# Patient Record
Sex: MALE | Race: WHITE | NOT HISPANIC OR LATINO
[De-identification: names, ages, dates, MRNs, and addresses within clinical notes are randomized per-mention and may not be internally consistent; named-entity substitution may affect disease eponyms.]

---

## 2018-05-01 ENCOUNTER — CHARTING TRANS (OUTPATIENT)
Dept: OTHER | Age: 38
End: 2018-05-01

## 2018-05-09 ENCOUNTER — IMAGING SERVICES (OUTPATIENT)
Dept: OTHER | Age: 38
End: 2018-05-09

## 2018-05-09 ENCOUNTER — HOSPITAL (OUTPATIENT)
Dept: OTHER | Age: 38
End: 2018-05-09

## 2018-11-01 VITALS
OXYGEN SATURATION: 95 % | DIASTOLIC BLOOD PRESSURE: 82 MMHG | HEIGHT: 70 IN | WEIGHT: 242.51 LBS | TEMPERATURE: 98.6 F | HEART RATE: 116 BPM | RESPIRATION RATE: 18 BRPM | BODY MASS INDEX: 34.72 KG/M2 | SYSTOLIC BLOOD PRESSURE: 132 MMHG

## 2018-11-26 ENCOUNTER — TELEPHONE (OUTPATIENT)
Dept: FAMILY MEDICINE CLINIC | Facility: CLINIC | Age: 38
End: 2018-11-26

## 2018-12-03 ENCOUNTER — LAB ENCOUNTER (OUTPATIENT)
Dept: LAB | Facility: REFERENCE LAB | Age: 38
End: 2018-12-03
Attending: FAMILY MEDICINE
Payer: COMMERCIAL

## 2018-12-03 ENCOUNTER — HOSPITAL ENCOUNTER (OUTPATIENT)
Dept: GENERAL RADIOLOGY | Age: 38
Discharge: HOME | End: 2018-12-03
Attending: FAMILY MEDICINE
Payer: COMMERCIAL

## 2018-12-03 ENCOUNTER — OFFICE VISIT (OUTPATIENT)
Dept: FAMILY MEDICINE CLINIC | Facility: CLINIC | Age: 38
End: 2018-12-03
Payer: COMMERCIAL

## 2018-12-03 VITALS
RESPIRATION RATE: 18 BRPM | TEMPERATURE: 98 F | DIASTOLIC BLOOD PRESSURE: 88 MMHG | WEIGHT: 251.63 LBS | HEART RATE: 96 BPM | HEIGHT: 69.5 IN | SYSTOLIC BLOOD PRESSURE: 126 MMHG | BODY MASS INDEX: 36.43 KG/M2

## 2018-12-03 DIAGNOSIS — R73.01 IMPAIRED FASTING GLUCOSE: ICD-10-CM

## 2018-12-03 DIAGNOSIS — Z00.00 ROUTINE ADULT HEALTH MAINTENANCE: ICD-10-CM

## 2018-12-03 DIAGNOSIS — M77.41 METATARSALGIA OF RIGHT FOOT: Primary | ICD-10-CM

## 2018-12-03 DIAGNOSIS — Z87.39 HISTORY OF GOUT: ICD-10-CM

## 2018-12-03 DIAGNOSIS — M77.41 METATARSALGIA OF RIGHT FOOT: ICD-10-CM

## 2018-12-03 DIAGNOSIS — E66.9 CLASS 2 OBESITY WITHOUT SERIOUS COMORBIDITY WITH BODY MASS INDEX (BMI) OF 36.0 TO 36.9 IN ADULT, UNSPECIFIED OBESITY TYPE: ICD-10-CM

## 2018-12-03 PROBLEM — E66.812 CLASS 2 OBESITY WITHOUT SERIOUS COMORBIDITY WITH BODY MASS INDEX (BMI) OF 36.0 TO 36.9 IN ADULT: Status: ACTIVE | Noted: 2018-12-03

## 2018-12-03 PROCEDURE — 83036 HEMOGLOBIN GLYCOSYLATED A1C: CPT | Performed by: FAMILY MEDICINE

## 2018-12-03 PROCEDURE — 99203 OFFICE O/P NEW LOW 30 MIN: CPT | Performed by: FAMILY MEDICINE

## 2018-12-03 PROCEDURE — 73630 X-RAY EXAM OF FOOT: CPT | Performed by: FAMILY MEDICINE

## 2018-12-03 PROCEDURE — 81001 URINALYSIS AUTO W/SCOPE: CPT | Performed by: FAMILY MEDICINE

## 2018-12-03 PROCEDURE — 36415 COLL VENOUS BLD VENIPUNCTURE: CPT | Performed by: FAMILY MEDICINE

## 2018-12-03 PROCEDURE — 84550 ASSAY OF BLOOD/URIC ACID: CPT | Performed by: FAMILY MEDICINE

## 2018-12-03 PROCEDURE — 80050 GENERAL HEALTH PANEL: CPT | Performed by: FAMILY MEDICINE

## 2018-12-03 PROCEDURE — 80061 LIPID PANEL: CPT | Performed by: FAMILY MEDICINE

## 2018-12-03 RX ORDER — METHYLPREDNISOLONE 4 MG/1
TABLET ORAL
Qty: 1 PACKAGE | Refills: 0 | Status: SHIPPED | OUTPATIENT
Start: 2018-12-03 | End: 2019-05-02

## 2018-12-03 NOTE — PATIENT INSTRUCTIONS
PLAN:  -fasting bloodwork  -urine analysis  -Xray R Foot  -gout diet information provided  -steroid pack    -start food diary.  I recommend [employer]    -return for physical at earliest convenience/as needed

## 2018-12-06 ENCOUNTER — TELEPHONE (OUTPATIENT)
Dept: FAMILY MEDICINE CLINIC | Facility: CLINIC | Age: 38
End: 2018-12-06

## 2018-12-06 NOTE — TELEPHONE ENCOUNTER
Ok great. F/u prn if foot pain continues after MDP. Suspect metatarsalgia and NOT gout attack. (patient described hx of gout, last uric acid 8.5 so typically goal <6, however clinical presentation did not seem c/w this).  CPM.

## 2018-12-06 NOTE — TELEPHONE ENCOUNTER
Called pt; Advised of nml results and xray, except: elevated trig and elevation of uric acid. Advised of regular exercise, low carb diet and daily omega 3/fish oil for cholesterol lowering - pt verbalizes understanding of recommendations and results.

## 2018-12-06 NOTE — TELEPHONE ENCOUNTER
----- Message from Ele Krishna DO sent at 12/5/2018  3:51 PM CST -----  Nml CBC/CMP/TSH/UA. A1C neg for DM II. Lipids: LDL chol minimally elevated; trig elevated.  Rec HHD/DASH diet regarding LDL chol lowering; regular exercise: goal 5x/week of mo

## 2019-05-02 RX ORDER — METHYLPREDNISOLONE 4 MG/1
TABLET ORAL
Qty: 1 PACKAGE | Refills: 0 | Status: SHIPPED | OUTPATIENT
Start: 2019-05-02 | End: 2019-06-17

## 2019-05-02 NOTE — TELEPHONE ENCOUNTER
A refill request was received for:  Requested Prescriptions     Pending Prescriptions Disp Refills   • methylPREDNISolone 4 MG Oral Tablet Therapy Pack 1 Package 0     Sig: Take blister pack of tablets as directed x 6 days     Last refill date: 12/3/2018

## 2019-06-14 ENCOUNTER — APPOINTMENT (OUTPATIENT)
Dept: CT IMAGING | Facility: HOSPITAL | Age: 39
End: 2019-06-14
Attending: EMERGENCY MEDICINE
Payer: COMMERCIAL

## 2019-06-14 ENCOUNTER — HOSPITAL ENCOUNTER (EMERGENCY)
Facility: HOSPITAL | Age: 39
Discharge: HOME | End: 2019-06-14
Attending: EMERGENCY MEDICINE
Payer: COMMERCIAL

## 2019-06-14 VITALS
OXYGEN SATURATION: 96 % | RESPIRATION RATE: 16 BRPM | TEMPERATURE: 98 F | HEART RATE: 102 BPM | DIASTOLIC BLOOD PRESSURE: 89 MMHG | SYSTOLIC BLOOD PRESSURE: 141 MMHG

## 2019-06-14 DIAGNOSIS — N23 RENAL COLIC: Primary | ICD-10-CM

## 2019-06-14 PROCEDURE — 81001 URINALYSIS AUTO W/SCOPE: CPT | Performed by: EMERGENCY MEDICINE

## 2019-06-14 PROCEDURE — 96375 TX/PRO/DX INJ NEW DRUG ADDON: CPT

## 2019-06-14 PROCEDURE — 96374 THER/PROPH/DIAG INJ IV PUSH: CPT

## 2019-06-14 PROCEDURE — 99284 EMERGENCY DEPT VISIT MOD MDM: CPT

## 2019-06-14 PROCEDURE — 85025 COMPLETE CBC W/AUTO DIFF WBC: CPT | Performed by: EMERGENCY MEDICINE

## 2019-06-14 PROCEDURE — 74176 CT ABD & PELVIS W/O CONTRAST: CPT | Performed by: EMERGENCY MEDICINE

## 2019-06-14 PROCEDURE — 96361 HYDRATE IV INFUSION ADD-ON: CPT

## 2019-06-14 PROCEDURE — 80048 BASIC METABOLIC PNL TOTAL CA: CPT | Performed by: EMERGENCY MEDICINE

## 2019-06-14 RX ORDER — ONDANSETRON 4 MG/1
4 TABLET, ORALLY DISINTEGRATING ORAL EVERY 8 HOURS PRN
Qty: 10 TABLET | Refills: 0 | Status: SHIPPED | OUTPATIENT
Start: 2019-06-14 | End: 2019-08-07

## 2019-06-14 RX ORDER — ONDANSETRON 2 MG/ML
4 INJECTION INTRAMUSCULAR; INTRAVENOUS ONCE
Status: COMPLETED | OUTPATIENT
Start: 2019-06-14 | End: 2019-06-14

## 2019-06-14 RX ORDER — MORPHINE SULFATE 4 MG/ML
8 INJECTION, SOLUTION INTRAMUSCULAR; INTRAVENOUS ONCE
Status: COMPLETED | OUTPATIENT
Start: 2019-06-14 | End: 2019-06-14

## 2019-06-14 RX ORDER — ONDANSETRON 2 MG/ML
4 INJECTION INTRAMUSCULAR; INTRAVENOUS ONCE
Status: DISCONTINUED | OUTPATIENT
Start: 2019-06-14 | End: 2019-06-14

## 2019-06-14 RX ORDER — TAMSULOSIN HYDROCHLORIDE 0.4 MG/1
0.4 CAPSULE ORAL DAILY
Qty: 7 CAPSULE | Refills: 0 | Status: SHIPPED | OUTPATIENT
Start: 2019-06-14 | End: 2019-06-17

## 2019-06-14 RX ORDER — HYDROCODONE BITARTRATE AND ACETAMINOPHEN 5; 325 MG/1; MG/1
1 TABLET ORAL EVERY 6 HOURS PRN
Qty: 10 TABLET | Refills: 0 | Status: SHIPPED | OUTPATIENT
Start: 2019-06-14 | End: 2019-06-17

## 2019-06-14 NOTE — ED PROVIDER NOTES
Patient Seen in: [facility] AND [facility] Emergency Department    History   Patient presents with:  Abdomen/Flank Pain (GI/): left flank     Stated Complaint: L.  Flank Pain    HPI    75-year-old male who is relatively healthy here with his wife for evaluation time.   Skin: Skin is warm but slightly diaphoretic. Psychiatric: Normal mood and affect. Behavior is normal.   Nursing note and vitals reviewed. Differential diagnosis includes renal colic, ureterolithiasis, hydronephrosis.       ED Course     Labs Re been electronically signed and verified by the Radiologist whose name is printed above. DD:  06/14/2019/DT:  06/14/2019      MDM   Patient feels markedly improved after second dose of treatment. He is tolerating p.o. His vitals have improved.   His wif

## 2019-06-14 NOTE — ED INITIAL ASSESSMENT (HPI)
Patient woke up \"out of nowhere\" at about 2am. With Stabbing left flank/back pain.  Vomiting 2x pta arrival.

## 2019-06-17 ENCOUNTER — TELEPHONE (OUTPATIENT)
Dept: FAMILY MEDICINE CLINIC | Facility: CLINIC | Age: 39
End: 2019-06-17

## 2019-06-17 ENCOUNTER — OFFICE VISIT (OUTPATIENT)
Dept: FAMILY MEDICINE CLINIC | Facility: CLINIC | Age: 39
End: 2019-06-17
Payer: COMMERCIAL

## 2019-06-17 VITALS
RESPIRATION RATE: 18 BRPM | TEMPERATURE: 99 F | OXYGEN SATURATION: 99 % | DIASTOLIC BLOOD PRESSURE: 90 MMHG | HEIGHT: 69.5 IN | WEIGHT: 243 LBS | SYSTOLIC BLOOD PRESSURE: 130 MMHG | HEART RATE: 74 BPM | BODY MASS INDEX: 35.18 KG/M2

## 2019-06-17 DIAGNOSIS — R71.8 ABNORMAL RBC: ICD-10-CM

## 2019-06-17 DIAGNOSIS — M79.671 PAIN IN BOTH FEET: ICD-10-CM

## 2019-06-17 DIAGNOSIS — R73.9 HYPERGLYCEMIA: ICD-10-CM

## 2019-06-17 DIAGNOSIS — M79.672 PAIN IN BOTH FEET: ICD-10-CM

## 2019-06-17 DIAGNOSIS — N20.0 KIDNEY STONE: Primary | ICD-10-CM

## 2019-06-17 DIAGNOSIS — Z00.00 ROUTINE GENERAL MEDICAL EXAMINATION AT A HEALTH CARE FACILITY: ICD-10-CM

## 2019-06-17 DIAGNOSIS — K42.9 UMBILICAL HERNIA WITHOUT OBSTRUCTION AND WITHOUT GANGRENE: ICD-10-CM

## 2019-06-17 DIAGNOSIS — N62 GYNECOMASTIA: ICD-10-CM

## 2019-06-17 DIAGNOSIS — J98.11 ATELECTASIS: ICD-10-CM

## 2019-06-17 DIAGNOSIS — R06.83 SNORING: ICD-10-CM

## 2019-06-17 DIAGNOSIS — M25.521 RIGHT ELBOW PAIN: ICD-10-CM

## 2019-06-17 DIAGNOSIS — M10.9 GOUT, UNSPECIFIED CAUSE, UNSPECIFIED CHRONICITY, UNSPECIFIED SITE: ICD-10-CM

## 2019-06-17 DIAGNOSIS — R79.89 ELEVATED SERUM CREATININE: ICD-10-CM

## 2019-06-17 DIAGNOSIS — R53.83 OTHER FATIGUE: ICD-10-CM

## 2019-06-17 DIAGNOSIS — N13.30 HYDRONEPHROSIS, UNSPECIFIED HYDRONEPHROSIS TYPE: ICD-10-CM

## 2019-06-17 DIAGNOSIS — R40.0 DAYTIME SOMNOLENCE: ICD-10-CM

## 2019-06-17 PROCEDURE — 87086 URINE CULTURE/COLONY COUNT: CPT | Performed by: FAMILY MEDICINE

## 2019-06-17 PROCEDURE — 99215 OFFICE O/P EST HI 40 MIN: CPT | Performed by: FAMILY MEDICINE

## 2019-06-17 PROCEDURE — 81015 MICROSCOPIC EXAM OF URINE: CPT | Performed by: FAMILY MEDICINE

## 2019-06-17 PROCEDURE — 81003 URINALYSIS AUTO W/O SCOPE: CPT | Performed by: FAMILY MEDICINE

## 2019-06-17 RX ORDER — TAMSULOSIN HYDROCHLORIDE 0.4 MG/1
0.4 CAPSULE ORAL DAILY
Qty: 30 CAPSULE | Refills: 0 | Status: SHIPPED | OUTPATIENT
Start: 2019-06-17 | End: 2019-07-17

## 2019-06-17 RX ORDER — HYDROCODONE BITARTRATE AND ACETAMINOPHEN 5; 325 MG/1; MG/1
1 TABLET ORAL EVERY 8 HOURS PRN
Qty: 20 TABLET | Refills: 0 | Status: SHIPPED | OUTPATIENT
Start: 2019-06-17 | End: 2019-08-07

## 2019-06-17 NOTE — PATIENT INSTRUCTIONS
PLAN:  -urine in the office nml-->sent to lab  -continue Flomax  -continue Norco as needed for pain  -stay well-hydrated  -avoid caffeinated beverages  -low oxalate diet  -try to catch stone  -fasting labs ordered  -referral to Urology    -sleep study orde

## 2019-06-17 NOTE — PROGRESS NOTES
HPI: 45year old male presents for 3 day ER f/u on kidney stone. VSS. CBC, CMP, UA done in ER. CBC WNLs, BMP elev glucose-130, Cr elev 1.41; UA large bld, hyaline casts, (+) RBCs, few bacteria; CBC elev RBC's-5.81. No urine cx performed.  Prescribed Zofran, infusion. LUNG BASES:  The heart is normal in size. There is dependent subsegmental atelectasis bilaterally. KIDNEYS:          A punctate left renal calculus is identified. An obstructing 0.3 cm calculus is lodged at the left ureterovesical junction.  The are appreciated. OTHER:             Trace right greater than left gynecomastia is noted. No free air or fluid is seen in the abdomen or pelvis. CONCLUSION:   1.  There is a punctate obstructing calculus lodged at the left ureterovesical junction with thyroid: no enlargement/tenderness/nodules. Lungs: Clear to auscultation bilaterally. Heart: Regular rate and rhythm, S1, S2 normal, no murmur, click, rub, or gallop.   Breasts: No breast masses, tenderness, asymmetry, nipple discharge or axillary lympha Norco as needed for pain  -stay well-hydrated  -avoid caffeinated beverages  -low oxalate diet  -try to catch stone  -fasting labs ordered  -referral to Urology    -sleep study ordered  -post-test consultation with Pulmonology    -breast US ordered    -ref

## 2019-06-18 NOTE — TELEPHONE ENCOUNTER
Can we get patient in to see Urology-Dr. Pari Draper or partner in the next couple weeks? Patient with obstructing 0.3cm L kidney stone, (+) assoc hydronephrosis, renal edema. CT abd/pelvis from ER 06/14/19 in [facility].     Thank you

## 2019-06-18 NOTE — TELEPHONE ENCOUNTER
Spoke to CHRISTIANO from Dr Cheryle Rex office. They are not on epic and I need to fax the results of CT to the office. A nurse will call me back with a date of appointment. CT was faxed to office.   FYI

## 2019-06-18 NOTE — TELEPHONE ENCOUNTER
Lory Lefort from dr Amy Dorsey office called me back. They will see Juan Francisco Talitahector this afternoon. Patient is aware.

## 2019-06-19 ENCOUNTER — LAB ENCOUNTER (OUTPATIENT)
Dept: LAB | Facility: REFERENCE LAB | Age: 39
End: 2019-06-19
Attending: FAMILY MEDICINE
Payer: COMMERCIAL

## 2019-06-19 ENCOUNTER — TELEPHONE (OUTPATIENT)
Dept: FAMILY MEDICINE CLINIC | Facility: CLINIC | Age: 39
End: 2019-06-19

## 2019-06-19 DIAGNOSIS — R53.83 OTHER FATIGUE: ICD-10-CM

## 2019-06-19 DIAGNOSIS — M10.9 GOUT, UNSPECIFIED CAUSE, UNSPECIFIED CHRONICITY, UNSPECIFIED SITE: ICD-10-CM

## 2019-06-19 DIAGNOSIS — Z00.00 ROUTINE GENERAL MEDICAL EXAMINATION AT A HEALTH CARE FACILITY: ICD-10-CM

## 2019-06-19 DIAGNOSIS — N62 GYNECOMASTIA: ICD-10-CM

## 2019-06-19 DIAGNOSIS — R79.89 ELEVATED SERUM CREATININE: ICD-10-CM

## 2019-06-19 DIAGNOSIS — R71.8 ABNORMAL RBC: ICD-10-CM

## 2019-06-19 DIAGNOSIS — R73.9 HYPERGLYCEMIA: ICD-10-CM

## 2019-06-19 PROCEDURE — 84443 ASSAY THYROID STIM HORMONE: CPT

## 2019-06-19 PROCEDURE — 85025 COMPLETE CBC W/AUTO DIFF WBC: CPT

## 2019-06-19 PROCEDURE — 84550 ASSAY OF BLOOD/URIC ACID: CPT

## 2019-06-19 PROCEDURE — 84403 ASSAY OF TOTAL TESTOSTERONE: CPT

## 2019-06-19 PROCEDURE — 80061 LIPID PANEL: CPT

## 2019-06-19 PROCEDURE — 84402 ASSAY OF FREE TESTOSTERONE: CPT

## 2019-06-19 PROCEDURE — 80053 COMPREHEN METABOLIC PANEL: CPT

## 2019-06-19 PROCEDURE — 84146 ASSAY OF PROLACTIN: CPT

## 2019-06-19 PROCEDURE — 83036 HEMOGLOBIN GLYCOSYLATED A1C: CPT

## 2019-06-19 PROCEDURE — 36415 COLL VENOUS BLD VENIPUNCTURE: CPT

## 2019-06-19 NOTE — TELEPHONE ENCOUNTER
----- Message from Bria Hsieh DO sent at 6/19/2019 11:47 AM CDT -----  Urine cx NEGATIVE.  CPM from OV 06/17/19

## 2019-06-25 PROBLEM — R79.89 ELEVATED LFTS: Status: ACTIVE | Noted: 2019-06-25

## 2019-06-25 PROBLEM — R73.03 PREDIABETES: Status: ACTIVE | Noted: 2019-06-25

## 2019-06-25 PROBLEM — E78.00 HYPERCHOLESTEROLEMIA: Status: ACTIVE | Noted: 2019-06-25

## 2019-06-25 PROBLEM — E79.0 HYPERURICEMIA: Status: ACTIVE | Noted: 2019-06-25

## 2019-06-25 PROBLEM — K76.0 FATTY LIVER: Status: ACTIVE | Noted: 2019-06-25

## 2019-06-25 PROBLEM — R71.8 ABNORMAL RBC: Status: ACTIVE | Noted: 2019-06-25

## 2019-06-26 ENCOUNTER — TELEPHONE (OUTPATIENT)
Dept: FAMILY MEDICINE CLINIC | Facility: CLINIC | Age: 39
End: 2019-06-26

## 2019-06-26 DIAGNOSIS — E79.0 HYPERURICEMIA: ICD-10-CM

## 2019-06-26 DIAGNOSIS — K76.0 FATTY LIVER: ICD-10-CM

## 2019-06-26 DIAGNOSIS — R73.03 PREDIABETES: Primary | ICD-10-CM

## 2019-06-26 NOTE — TELEPHONE ENCOUNTER
----- Message from Mira Castellanos DO sent at 6/25/2019  5:30 PM CDT -----  Pls call:    Nml TSH/testosterone/prolactin. Lipids: slightly elev LDL chol. Rec HHD/DASH diet    Uric acid elevated-9. This can inc risk for gout.  Rec Allopurinol 100mg qd R

## 2019-06-27 RX ORDER — ALLOPURINOL 100 MG/1
100 TABLET ORAL DAILY
Qty: 30 TABLET | Refills: 0 | Status: SHIPPED | OUTPATIENT
Start: 2019-06-27 | End: 2019-07-31

## 2019-06-27 NOTE — TELEPHONE ENCOUNTER
Spoke to pt, informed him of lab results and all recommendations per Dr. Harry Lawrence. Pt would like a referral to a dietician; PENDED. Allopurinol 100mg daily: PENDED. Pt states he will completed the uric acid lab in 2 weeks; order in Epic.

## 2019-07-08 ENCOUNTER — HOSPITAL ENCOUNTER (OUTPATIENT)
Dept: MAMMOGRAPHY | Facility: HOSPITAL | Age: 39
Discharge: HOME | End: 2019-07-08
Attending: FAMILY MEDICINE
Payer: COMMERCIAL

## 2019-07-08 ENCOUNTER — HOSPITAL ENCOUNTER (OUTPATIENT)
Dept: ULTRASOUND IMAGING | Facility: HOSPITAL | Age: 39
Discharge: HOME | End: 2019-07-08
Attending: FAMILY MEDICINE
Payer: COMMERCIAL

## 2019-07-08 DIAGNOSIS — N62 GYNECOMASTIA: ICD-10-CM

## 2019-07-08 PROCEDURE — 76642 ULTRASOUND BREAST LIMITED: CPT | Performed by: FAMILY MEDICINE

## 2019-07-08 PROCEDURE — 77066 DX MAMMO INCL CAD BI: CPT | Performed by: FAMILY MEDICINE

## 2019-07-08 PROCEDURE — 77062 BREAST TOMOSYNTHESIS BI: CPT | Performed by: FAMILY MEDICINE

## 2019-07-11 RX ORDER — TAMSULOSIN HYDROCHLORIDE 0.4 MG/1
0.4 CAPSULE ORAL DAILY
Qty: 30 CAPSULE | Refills: 2 | OUTPATIENT
Start: 2019-07-11 | End: 2019-10-09

## 2019-07-11 NOTE — TELEPHONE ENCOUNTER
A refill request was received for:  Requested Prescriptions     Pending Prescriptions Disp Refills   • tamsulosin HCl (FLOMAX) 0.4 MG Oral Cap 30 capsule 0     Sig: Take 1 capsule (0.4 mg total) by mouth daily.      Last refill date: 06/17/2019  Qty:30

## 2019-07-11 NOTE — TELEPHONE ENCOUNTER
Flomax Rx utilized for short-term use only to help pass kidney stone. Defer to Urology-Dr. Guerrero Cancer if Flomax should be continued (typically utilized for 45 days only to help pass stone). Patient saw Urologist 06/18/19-scanned in media.

## 2019-07-14 ENCOUNTER — PATIENT MESSAGE (OUTPATIENT)
Dept: FAMILY MEDICINE CLINIC | Facility: CLINIC | Age: 39
End: 2019-07-14

## 2019-07-15 RX ORDER — HYDROCODONE BITARTRATE AND ACETAMINOPHEN 5; 325 MG/1; MG/1
1 TABLET ORAL EVERY 8 HOURS PRN
Qty: 20 TABLET | Refills: 0 | OUTPATIENT
Start: 2019-07-15

## 2019-07-15 NOTE — TELEPHONE ENCOUNTER
Called patient to make an appointment to go over his results and a plan for his medication. Patient made it for Friday.

## 2019-07-16 ENCOUNTER — HOSPITAL ENCOUNTER (OUTPATIENT)
Dept: CT IMAGING | Facility: HOSPITAL | Age: 39
Discharge: HOME | End: 2019-07-16
Attending: UROLOGY
Payer: COMMERCIAL

## 2019-07-16 DIAGNOSIS — N20.0 KIDNEY STONE: ICD-10-CM

## 2019-07-16 PROCEDURE — 74176 CT ABD & PELVIS W/O CONTRAST: CPT | Performed by: UROLOGY

## 2019-07-22 ENCOUNTER — OFFICE VISIT (OUTPATIENT)
Dept: INTEGRATIVE MEDICINE | Facility: CLINIC | Age: 39
End: 2019-07-22
Payer: COMMERCIAL

## 2019-07-22 VITALS
SYSTOLIC BLOOD PRESSURE: 128 MMHG | TEMPERATURE: 98 F | OXYGEN SATURATION: 97 % | BODY MASS INDEX: 34.69 KG/M2 | DIASTOLIC BLOOD PRESSURE: 84 MMHG | WEIGHT: 239.63 LBS | HEART RATE: 82 BPM | HEIGHT: 69.5 IN

## 2019-07-22 DIAGNOSIS — Z87.39 HISTORY OF GOUT: ICD-10-CM

## 2019-07-22 DIAGNOSIS — E79.0 HYPERURICEMIA: ICD-10-CM

## 2019-07-22 DIAGNOSIS — R52 PAIN: Primary | ICD-10-CM

## 2019-07-22 DIAGNOSIS — M1A.0710 CHRONIC IDIOPATHIC GOUT INVOLVING TOE OF RIGHT FOOT WITHOUT TOPHUS: ICD-10-CM

## 2019-07-22 PROCEDURE — 99214 OFFICE O/P EST MOD 30 MIN: CPT | Performed by: PHYSICIAN ASSISTANT

## 2019-07-22 RX ORDER — ESCITALOPRAM OXALATE 5 MG/1
TABLET ORAL
Qty: 90 TABLET | Refills: 0 | Status: SHIPPED | OUTPATIENT
Start: 2019-07-22 | End: 2019-07-29

## 2019-07-22 NOTE — PATIENT INSTRUCTIONS
Get uric acid checked. escitalopram (lexapro)    Take 1 tablet for 1 week. Then increase to 2 tablets for 1 week. Then increase to 3 tablets for 1 week. Then increase to 4 tablets.,     Make appointment with Rheumatology.      If pain is not improving w

## 2019-07-22 NOTE — PROGRESS NOTES
Delmy Mercer is a 45year old male. Patient presents with: Follow - Up: gout , pain       HPI:   Was taking norco. When he had kidney stones. Helped with overall pain. Going to dietician on 7th. Had to reschedule sleep study. Stopped allopurinol becnoam 1 tablet (4 mg total) by mouth every 8 (eight) hours as needed for Nausea. Disp: 10 tablet Rfl: 0   Omega-3 Fatty Acids (FISH OIL) 1200 MG Oral Cap Take 1 capsule by mouth daily.  Disp: 90 capsule Rfl: 3       SOCIAL HISTORY:   Social History    Socioeconom reviewed. No pertinent surgical history.     PHYSICAL EXAM:      07/22/19  1821   BP: 128/84   Pulse: 82   Temp: 98 °F (36.7 °C)   SpO2: 97%   Weight: 239 lb 9.6 oz   Height: 69.5\"       Physical Exam   Constitutional: He is oriented to person, place, and checked. escitalopram (lexapro)    Take 1 tablet for 1 week. Then increase to 2 tablets for 1 week. Then increase to 3 tablets for 1 week. Then increase to 4 tablets.,     Make appointment with Rheumatology.      If pain is not improving we will refer y

## 2019-07-23 ENCOUNTER — LAB ENCOUNTER (OUTPATIENT)
Dept: LAB | Facility: REFERENCE LAB | Age: 39
End: 2019-07-23
Attending: PHYSICIAN ASSISTANT
Payer: COMMERCIAL

## 2019-07-23 DIAGNOSIS — M1A.0710 CHRONIC IDIOPATHIC GOUT INVOLVING TOE OF RIGHT FOOT WITHOUT TOPHUS: ICD-10-CM

## 2019-07-23 LAB — URATE SERPL-MCNC: 8.1 MG/DL (ref 3.5–7.2)

## 2019-07-23 PROCEDURE — 84550 ASSAY OF BLOOD/URIC ACID: CPT

## 2019-07-23 PROCEDURE — 36415 COLL VENOUS BLD VENIPUNCTURE: CPT

## 2019-07-25 NOTE — PROGRESS NOTES
Juanjose Langford,     Your uric acid is still elevated but looks like it was decreasing from a month ago. I would continue the allopurinol. If you continue to have problems with this medication, let us know.      Manjula Arellano PA-C

## 2019-07-28 ENCOUNTER — PATIENT MESSAGE (OUTPATIENT)
Dept: INTEGRATIVE MEDICINE | Facility: CLINIC | Age: 39
End: 2019-07-28

## 2019-07-28 DIAGNOSIS — R52 PAIN: Primary | ICD-10-CM

## 2019-07-29 RX ORDER — DULOXETIN HYDROCHLORIDE 20 MG/1
20 CAPSULE, DELAYED RELEASE ORAL DAILY
Qty: 30 CAPSULE | Refills: 1 | Status: SHIPPED | OUTPATIENT
Start: 2019-07-29 | End: 2020-02-11 | Stop reason: ALTCHOICE

## 2019-07-31 DIAGNOSIS — E79.0 HYPERURICEMIA: ICD-10-CM

## 2019-07-31 RX ORDER — ALLOPURINOL 100 MG/1
100 TABLET ORAL DAILY
Qty: 30 TABLET | Refills: 0 | Status: SHIPPED | OUTPATIENT
Start: 2019-07-31 | End: 2019-08-07

## 2019-07-31 NOTE — TELEPHONE ENCOUNTER
Lissa Winston pt    A refill request was received for:  Requested Prescriptions     Pending Prescriptions Disp Refills   • allopurinol 100 MG Oral Tab 30 tablet 0     Sig: Take 1 tablet (100 mg total) by mouth daily.      Last refill date: 6/27/2019  Qty:  30  L

## 2019-08-07 ENCOUNTER — OFFICE VISIT (OUTPATIENT)
Dept: RHEUMATOLOGY | Facility: CLINIC | Age: 39
End: 2019-08-07
Payer: COMMERCIAL

## 2019-08-07 VITALS
SYSTOLIC BLOOD PRESSURE: 131 MMHG | HEIGHT: 69.5 IN | BODY MASS INDEX: 35.47 KG/M2 | DIASTOLIC BLOOD PRESSURE: 90 MMHG | WEIGHT: 245 LBS | HEART RATE: 69 BPM

## 2019-08-07 DIAGNOSIS — E79.0 HYPERURICEMIA: ICD-10-CM

## 2019-08-07 DIAGNOSIS — M10.09 ACUTE IDIOPATHIC GOUT OF MULTIPLE SITES: Primary | ICD-10-CM

## 2019-08-07 PROCEDURE — 99244 OFF/OP CNSLTJ NEW/EST MOD 40: CPT | Performed by: INTERNAL MEDICINE

## 2019-08-07 RX ORDER — COLCHICINE 0.6 MG/1
TABLET ORAL
Qty: 180 TABLET | Refills: 1 | Status: SHIPPED | OUTPATIENT
Start: 2019-08-07 | End: 2020-02-11 | Stop reason: ALTCHOICE

## 2019-08-07 RX ORDER — ALLOPURINOL 100 MG/1
100 TABLET ORAL DAILY
Qty: 90 TABLET | Refills: 5 | Status: SHIPPED | OUTPATIENT
Start: 2019-08-07 | End: 2020-02-11 | Stop reason: ALTCHOICE

## 2019-08-07 NOTE — PROGRESS NOTES
Dear Dr. Heidi Randhawa:    I saw your patient Garret Wong in consultation this morning at your request for evaluation of gout.   As you know, he is a 27-year-old gentleman who had his first episode of gout several years ago in his right foot, under his MTPs gout.    Social history:  He is  with 3 kids. No cigarettes. Occasional alcohol. He drinks coffee. He does cardio exercise. 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     Review of systems:  He thinks he may article on gout. He will take colchicine 0.6 mg twice a day for 6 months, to prevent recurrent attacks. 2.  Right tennis elbow. He will continue using his tennis elbow band. 3.  Discomfort across his feet. Metatarsalgia.   His right foot xrays are u

## 2019-08-22 NOTE — TELEPHONE ENCOUNTER
A refill request was received for:  Requested Prescriptions     Pending Prescriptions Disp Refills   • tamsulosin HCl 0.4 MG Oral Cap 30 capsule 0     Sig: Take 1 capsule (0.4 mg total) by mouth daily.      Last refill date: 06/17/2019  Qty:30  Last office

## 2019-08-23 RX ORDER — TAMSULOSIN HYDROCHLORIDE 0.4 MG/1
0.4 CAPSULE ORAL DAILY
Qty: 30 CAPSULE | Refills: 0 | Status: SHIPPED | OUTPATIENT
Start: 2019-08-23 | End: 2019-09-22

## 2019-10-30 ENCOUNTER — HOSPITAL (OUTPATIENT)
Dept: OTHER | Age: 39
End: 2019-10-30

## 2019-10-30 LAB — GLUCOSE BLDC GLUCOMTR-MCNC: 101 MG/DL (ref 70–99)

## 2019-10-30 PROCEDURE — 99284 EMERGENCY DEPT VISIT MOD MDM: CPT | Performed by: EMERGENCY MEDICINE

## 2020-02-11 ENCOUNTER — OFFICE VISIT (OUTPATIENT)
Dept: FAMILY MEDICINE CLINIC | Facility: CLINIC | Age: 40
End: 2020-02-11
Payer: COMMERCIAL

## 2020-02-11 ENCOUNTER — APPOINTMENT (OUTPATIENT)
Dept: LAB | Facility: REFERENCE LAB | Age: 40
End: 2020-02-11
Attending: FAMILY MEDICINE
Payer: COMMERCIAL

## 2020-02-11 VITALS
TEMPERATURE: 98 F | SYSTOLIC BLOOD PRESSURE: 110 MMHG | WEIGHT: 249 LBS | RESPIRATION RATE: 18 BRPM | DIASTOLIC BLOOD PRESSURE: 82 MMHG | HEIGHT: 70 IN | OXYGEN SATURATION: 97 % | BODY MASS INDEX: 35.65 KG/M2 | HEART RATE: 73 BPM

## 2020-02-11 DIAGNOSIS — Z00.00 ROUTINE MEDICAL EXAM: ICD-10-CM

## 2020-02-11 DIAGNOSIS — K42.9 UMBILICAL HERNIA WITHOUT OBSTRUCTION AND WITHOUT GANGRENE: ICD-10-CM

## 2020-02-11 DIAGNOSIS — R73.03 PREDIABETES: ICD-10-CM

## 2020-02-11 DIAGNOSIS — E66.09 OBESITY DUE TO EXCESS CALORIES WITHOUT SERIOUS COMORBIDITY, UNSPECIFIED CLASSIFICATION: Primary | ICD-10-CM

## 2020-02-11 DIAGNOSIS — H53.8 BLURRED VISION, BILATERAL: ICD-10-CM

## 2020-02-11 DIAGNOSIS — Z23 NEED FOR TDAP VACCINATION: ICD-10-CM

## 2020-02-11 DIAGNOSIS — Z28.21 VACCINATION NOT CARRIED OUT BECAUSE OF PATIENT REFUSAL: ICD-10-CM

## 2020-02-11 DIAGNOSIS — R79.89 ELEVATED LFTS: ICD-10-CM

## 2020-02-11 LAB
ALBUMIN SERPL-MCNC: 4.4 G/DL (ref 3.4–5)
ALBUMIN/GLOB SERPL: 1.3 {RATIO} (ref 1–2)
ALP LIVER SERPL-CCNC: 40 U/L (ref 45–117)
ALT SERPL-CCNC: 55 U/L (ref 16–61)
ANION GAP SERPL CALC-SCNC: 3 MMOL/L (ref 0–18)
AST SERPL-CCNC: 30 U/L (ref 15–37)
BILIRUB SERPL-MCNC: 0.6 MG/DL (ref 0.1–2)
BUN BLD-MCNC: 14 MG/DL (ref 7–18)
BUN/CREAT SERPL: 11.8 (ref 10–20)
CALCIUM BLD-MCNC: 9.4 MG/DL (ref 8.5–10.1)
CHLORIDE SERPL-SCNC: 106 MMOL/L (ref 98–112)
CO2 SERPL-SCNC: 29 MMOL/L (ref 21–32)
CREAT BLD-MCNC: 1.19 MG/DL (ref 0.7–1.3)
EST. AVERAGE GLUCOSE BLD GHB EST-MCNC: 111 MG/DL (ref 68–126)
GLOBULIN PLAS-MCNC: 3.3 G/DL (ref 2.8–4.4)
GLUCOSE BLD-MCNC: 96 MG/DL (ref 70–99)
HBA1C MFR BLD HPLC: 5.5 % (ref ?–5.7)
M PROTEIN MFR SERPL ELPH: 7.7 G/DL (ref 6.4–8.2)
OSMOLALITY SERPL CALC.SUM OF ELEC: 286 MOSM/KG (ref 275–295)
PATIENT FASTING Y/N/NP: NO
POTASSIUM SERPL-SCNC: 4.4 MMOL/L (ref 3.5–5.1)
SODIUM SERPL-SCNC: 138 MMOL/L (ref 136–145)

## 2020-02-11 PROCEDURE — 83036 HEMOGLOBIN GLYCOSYLATED A1C: CPT

## 2020-02-11 PROCEDURE — 80053 COMPREHEN METABOLIC PANEL: CPT

## 2020-02-11 PROCEDURE — 90471 IMMUNIZATION ADMIN: CPT | Performed by: FAMILY MEDICINE

## 2020-02-11 PROCEDURE — 36415 COLL VENOUS BLD VENIPUNCTURE: CPT

## 2020-02-11 PROCEDURE — 90715 TDAP VACCINE 7 YRS/> IM: CPT | Performed by: FAMILY MEDICINE

## 2020-02-11 PROCEDURE — 99214 OFFICE O/P EST MOD 30 MIN: CPT | Performed by: FAMILY MEDICINE

## 2020-02-11 RX ORDER — METFORMIN HYDROCHLORIDE 500 MG/1
500 TABLET, EXTENDED RELEASE ORAL 2 TIMES DAILY WITH MEALS
Qty: 60 TABLET | Refills: 1 | Status: SHIPPED | OUTPATIENT
Start: 2020-02-11 | End: 2020-04-17

## 2020-02-14 DIAGNOSIS — N20.0 KIDNEY STONE: Primary | ICD-10-CM

## 2020-02-14 DIAGNOSIS — R79.89 ELEVATED LFTS: ICD-10-CM

## 2020-02-14 DIAGNOSIS — R73.03 PREDIABETES: ICD-10-CM

## 2020-02-14 DIAGNOSIS — Z79.899 LONG-TERM USE OF HIGH-RISK MEDICATION: ICD-10-CM

## 2020-03-04 ENCOUNTER — OFFICE VISIT (OUTPATIENT)
Dept: SURGERY | Facility: CLINIC | Age: 40
End: 2020-03-04
Payer: COMMERCIAL

## 2020-03-04 VITALS — BODY MASS INDEX: 35.65 KG/M2 | HEIGHT: 70 IN | WEIGHT: 249 LBS

## 2020-03-04 DIAGNOSIS — K43.9 VENTRAL HERNIA WITHOUT OBSTRUCTION OR GANGRENE: Primary | ICD-10-CM

## 2020-03-04 PROCEDURE — 99244 OFF/OP CNSLTJ NEW/EST MOD 40: CPT | Performed by: SURGERY

## 2020-03-04 NOTE — H&P
Chief complaint: Patient presents with:  Hernia      HPI: Taina Alonzo presents after referral from Dr. David Velasquez for evaluation of a ventral hernia. Taina Alonzo has had the hernia for some time, it is becoming increasingly symptomatic.   Over the last year he ha well  SKIN: no ulcerated or worrisome skin lesions  EYES:denies blurred vision or double vision  HEENT: denies new nasal congestion, sinus pain or ST  LUNGS: denies shortness of breath with exertion  CARDIOVASCULAR: denies chest pain on exertion  GI: no he questions have been answered to his satisfaction.     Basilio Blum MD  3/4/2020  1:12 PM

## 2020-03-24 ENCOUNTER — PATIENT MESSAGE (OUTPATIENT)
Dept: FAMILY MEDICINE CLINIC | Facility: CLINIC | Age: 40
End: 2020-03-24

## 2020-03-24 ENCOUNTER — TELEPHONE (OUTPATIENT)
Dept: SURGERY | Facility: CLINIC | Age: 40
End: 2020-03-24

## 2020-03-24 DIAGNOSIS — R73.03 PREDIABETES: Primary | ICD-10-CM

## 2020-03-24 DIAGNOSIS — E78.00 HYPERCHOLESTEROLEMIA: ICD-10-CM

## 2020-03-24 DIAGNOSIS — E66.9 CLASS 2 OBESITY WITHOUT SERIOUS COMORBIDITY WITH BODY MASS INDEX (BMI) OF 36.0 TO 36.9 IN ADULT, UNSPECIFIED OBESITY TYPE: ICD-10-CM

## 2020-04-17 DIAGNOSIS — R73.03 PREDIABETES: ICD-10-CM

## 2020-04-17 DIAGNOSIS — E66.09 OBESITY DUE TO EXCESS CALORIES WITHOUT SERIOUS COMORBIDITY, UNSPECIFIED CLASSIFICATION: ICD-10-CM

## 2020-04-17 RX ORDER — METFORMIN HYDROCHLORIDE 500 MG/1
500 TABLET, EXTENDED RELEASE ORAL 2 TIMES DAILY WITH MEALS
Qty: 180 TABLET | Refills: 0 | Status: SHIPPED | OUTPATIENT
Start: 2020-04-17 | End: 2020-11-11 | Stop reason: ALTCHOICE

## 2020-04-17 NOTE — TELEPHONE ENCOUNTER
A refill request was received for:  Requested Prescriptions     Pending Prescriptions Disp Refills   • metFORMIN HCl  MG Oral Tablet 24 Hr 60 tablet 1     Sig: Take 1 tablet (500 mg total) by mouth 2 (two) times daily with meals.      Last refill date

## 2020-04-27 ENCOUNTER — TELEMEDICINE (OUTPATIENT)
Dept: SURGERY | Facility: CLINIC | Age: 40
End: 2020-04-27

## 2020-04-27 VITALS — WEIGHT: 249 LBS | BODY MASS INDEX: 35.65 KG/M2 | HEIGHT: 70 IN

## 2020-04-27 DIAGNOSIS — R06.83 SNORING: ICD-10-CM

## 2020-04-27 DIAGNOSIS — E78.00 HYPERCHOLESTEROLEMIA: Primary | ICD-10-CM

## 2020-04-27 DIAGNOSIS — K76.0 FATTY LIVER: ICD-10-CM

## 2020-04-27 DIAGNOSIS — K21.9 GASTROESOPHAGEAL REFLUX DISEASE, ESOPHAGITIS PRESENCE NOT SPECIFIED: ICD-10-CM

## 2020-04-27 DIAGNOSIS — M47.9 DEGENERATIVE JOINT DISEASE OF LOW BACK: ICD-10-CM

## 2020-04-27 DIAGNOSIS — K42.9 UMBILICAL HERNIA WITHOUT OBSTRUCTION AND WITHOUT GANGRENE: ICD-10-CM

## 2020-04-27 DIAGNOSIS — R73.03 PREDIABETES: ICD-10-CM

## 2020-04-27 DIAGNOSIS — E66.9 OBESITY (BMI 30-39.9): ICD-10-CM

## 2020-04-27 DIAGNOSIS — Z87.39 HISTORY OF GOUT: ICD-10-CM

## 2020-04-27 PROCEDURE — 99204 OFFICE O/P NEW MOD 45 MIN: CPT | Performed by: NURSE PRACTITIONER

## 2020-04-27 NOTE — PATIENT INSTRUCTIONS
Values: Health, Family, To Be Fit for Work     Complete a Home Sleep Study. Attend bariatric seminar. To schedule bariatric seminar:   Call [phone] or   Schedule Online at- [email or web address]    Meet with the dietician.      1. A to drink at least 64 oz of water a day, you can increase this to half your body weight in ounces/day. Aim for total daily carbohydrates:  g/day- non starchy vegetables are free after that (ex. greens, peppers, broccoli, cauliflower).     Aim for 6

## 2020-04-27 NOTE — PROGRESS NOTES
Virtual Video/Telephone Check-In    Reynaldo Sims verbally consents to a [facility] [employer] [facility] visit on 04/27/20.     Patient understands and accepts financial responsibility for any deductible, co-insurance and/or co-pays associated with this s Readings from Last 6 Encounters:  04/27/20 : 249 lb (112.9 kg)  03/04/20 : 249 lb (112.9 kg)  02/11/20 : 249 lb (112.9 kg)  08/07/19 : 245 lb (111.1 kg)  07/22/19 : 239 lb 9.6 oz (108.7 kg)  06/17/19 : 243 lb (110.2 kg)       Patient Medications:    Kelsea Fox partner violence:        Fear of current or ex partner: Not on file        Emotionally abused: Not on file        Physically abused: Not on file        Forced sexual activity: Not on file    Other Topics      Concerns:        Caffeine Concern: Not Asked each meal      ROS:  Constitutional: negative  Respiratory: negative  Cardiovascular: negative  Gastrointestinal: positive for reflux symptoms  Integument/breast: negative  Hematologic/lymphatic: negative  Musculoskeletal:positive for back pain and DJD of DIETITIAN EDUCATION INITIAL, DIET (INTERNAL)    Fatty liver  -     DIETITIAN EDUCATION INITIAL, DIET (INTERNAL)    Snoring  -     DIETITIAN EDUCATION INITIAL, DIET (INTERNAL)    Gastroesophageal reflux disease, esophagitis presence not specified  -     DIE stress. Reviewed labs:  2/11/2020- Renal and liver function intact  7/23/19- Uric acid level elevated   6/19/19- TSH in range    Discussed medication options for weight loss in detail with patient.      Hold off on medications for weight loss at this

## 2020-05-13 ENCOUNTER — TELEPHONE (OUTPATIENT)
Dept: SURGERY | Facility: CLINIC | Age: 40
End: 2020-05-13

## 2020-05-13 DIAGNOSIS — K43.9 VENTRAL HERNIA WITHOUT OBSTRUCTION OR GANGRENE: Primary | ICD-10-CM

## 2020-05-13 NOTE — TELEPHONE ENCOUNTER
Called patient 2 times to discuss insurance benefits and Bariatric Program, give Bariatric seminar info, and schedule Dietitian appt but no answer and no voice mail.  Info sent to surgeon's office for scheduling and message sent to Katie Burnett RD that

## 2020-05-27 ENCOUNTER — TELEMEDICINE (OUTPATIENT)
Dept: SURGERY | Facility: CLINIC | Age: 40
End: 2020-05-27
Payer: COMMERCIAL

## 2020-05-27 DIAGNOSIS — K21.9 GASTROESOPHAGEAL REFLUX DISEASE, ESOPHAGITIS PRESENCE NOT SPECIFIED: ICD-10-CM

## 2020-05-27 DIAGNOSIS — E66.9 OBESITY (BMI 30-39.9): ICD-10-CM

## 2020-05-27 DIAGNOSIS — R73.03 PREDIABETES: ICD-10-CM

## 2020-05-27 DIAGNOSIS — M47.9 DEGENERATIVE JOINT DISEASE OF LOW BACK: ICD-10-CM

## 2020-05-27 DIAGNOSIS — K42.9 UMBILICAL HERNIA WITHOUT OBSTRUCTION AND WITHOUT GANGRENE: ICD-10-CM

## 2020-05-27 DIAGNOSIS — Z87.39 HISTORY OF GOUT: ICD-10-CM

## 2020-05-27 DIAGNOSIS — R06.83 SNORING: ICD-10-CM

## 2020-05-27 DIAGNOSIS — K76.0 FATTY LIVER: ICD-10-CM

## 2020-05-27 DIAGNOSIS — E78.00 HYPERCHOLESTEROLEMIA: Primary | ICD-10-CM

## 2020-05-27 PROCEDURE — 99214 OFFICE O/P EST MOD 30 MIN: CPT | Performed by: NURSE PRACTITIONER

## 2020-05-27 NOTE — PROGRESS NOTES
Virtual Video/Telephone Check-In    Pre-op Appointment 2/3    Telephone encounter due to [employer]. Jerrod Mancilla verbally consents to a Virtual Video/Telephone Check-In visit on 05/27/20.   Patient has been referred to the [facility] website at ww Assessment:  HLD, Prediabetes, GERD Fatty Liver, Suspected ABIEL, DJD of Back, Hx Gout; Obesity     Plan:  Reviewed with patient the risks and benefits of laparoscopic Sleeve Gastrectomy vs RYGBP.  The patient is interested in bariatric surgery and has be exercise and is frustrated with increase of weight. Weight has been a struggle for the past several years and is now starting to develop into co-morbidities that are worrisome to the patient.  Patient is interested in losing weight, so it can stay off long

## 2020-05-27 NOTE — PATIENT INSTRUCTIONS
To schedule with dietician call [phone].     1. Attend seminar. (done)  2. Stop smoking, pop, caffeine, carbonated drinks if indicated. 3. Check into surgical benefits and is referral required. (done)  4. Meet with dietician.   5. Meet with surgeon

## 2020-06-10 ENCOUNTER — OFFICE VISIT (OUTPATIENT)
Dept: SURGERY | Facility: CLINIC | Age: 40
End: 2020-06-10
Payer: COMMERCIAL

## 2020-06-10 ENCOUNTER — DOCUMENTATION ONLY (OUTPATIENT)
Dept: SURGERY | Facility: CLINIC | Age: 40
End: 2020-06-10

## 2020-06-10 VITALS
OXYGEN SATURATION: 96 % | HEART RATE: 83 BPM | SYSTOLIC BLOOD PRESSURE: 137 MMHG | DIASTOLIC BLOOD PRESSURE: 93 MMHG | BODY MASS INDEX: 37.16 KG/M2 | WEIGHT: 250.88 LBS | HEIGHT: 69 IN

## 2020-06-10 DIAGNOSIS — E66.01 MORBID OBESITY DUE TO EXCESS CALORIES (HCC): ICD-10-CM

## 2020-06-10 DIAGNOSIS — K76.0 FATTY LIVER: ICD-10-CM

## 2020-06-10 DIAGNOSIS — R73.03 PREDIABETES: ICD-10-CM

## 2020-06-10 DIAGNOSIS — K42.9 UMBILICAL HERNIA WITHOUT OBSTRUCTION AND WITHOUT GANGRENE: ICD-10-CM

## 2020-06-10 DIAGNOSIS — M47.9 DEGENERATIVE JOINT DISEASE OF LOW BACK: ICD-10-CM

## 2020-06-10 DIAGNOSIS — R06.83 SNORING: ICD-10-CM

## 2020-06-10 DIAGNOSIS — K21.9 GASTROESOPHAGEAL REFLUX DISEASE, ESOPHAGITIS PRESENCE NOT SPECIFIED: ICD-10-CM

## 2020-06-10 DIAGNOSIS — E78.00 HYPERCHOLESTEROLEMIA: Primary | ICD-10-CM

## 2020-06-10 DIAGNOSIS — Z87.39 HISTORY OF GOUT: ICD-10-CM

## 2020-06-10 NOTE — PROGRESS NOTES
Oriented pt to the bariatric program; provided/reviewed bariatric packet of info, time line, referrals, etc; pt agreed and verbalized understanding; attended bariatric seminar on 5/11/20.

## 2020-06-10 NOTE — H&P
[address]  Dept: [phone]    6/10/2020  Bariatric New Patient Evaluation    Chief Complaint:  morbid obesity     History of Present Ill Smoker      Smokeless tobacco: Current User        Types: Chew    Substance and Sexual Activity      Alcohol use:  Yes        Alcohol/week: 4.0 - 6.0 standard drinks        Types: 4 - 6 Cans of beer per week      Drug use: No    Other Topics      Concerns: elevated blood pressure, back arthritis, and snoring who would benefit from significant and sustained weight loss. The main factor in choosing his surgery is actually his gout.   He requires steroids on occasion and with his NSAID allergy and intolerance t

## 2020-06-22 ENCOUNTER — TELEPHONE (OUTPATIENT)
Dept: CARDIOLOGY | Age: 40
End: 2020-06-22

## 2020-06-23 ENCOUNTER — OFFICE VISIT (OUTPATIENT)
Dept: NUTRITION/OBESITY MEDICINE | Facility: HOSPITAL | Age: 40
End: 2020-06-23
Attending: SINGLE SPECIALTY
Payer: COMMERCIAL

## 2020-06-23 DIAGNOSIS — E66.01 MORBID OBESITY DUE TO EXCESS CALORIES (HCC): Primary | ICD-10-CM

## 2020-06-23 PROCEDURE — 96130 PSYCL TST EVAL PHYS/QHP 1ST: CPT | Performed by: OTHER

## 2020-06-23 NOTE — PROGRESS NOTES
Psychological Evaluation    Patient Name: Zully Becker  Visit Date:  2020  :   1980    Reason for Referral:    Niles Conn is a 44year old   male who was referred for a psychological evaluation prior to being scheduled for [facility] discontinued it. Concerning medical issues, Taina Alonzo noted that he has been diagnosed with GERD, rheumatoid arthritis in his back, pre-diabetes, kidney stones, gout, and hypercholesterolemia. Currently Taina Alonzo is taking Metformin.  Regarding a familial hist presentation of personal information. He was oriented as to time, person, and place. There appeared to be no abnormalities in his sensorium or mental capacity. His overall affect and mood fell within the normal range.   Also, his speech and thought proces hypercholesterolemia.    AXIS IV: Other psychosocial problems (health issues due to being overweight)  AXIS V: GAF= 80  (current)    Conclusions:    María Bush is a 44year old   male with a long history of weight problems, which began when he w

## 2020-06-29 ENCOUNTER — OFFICE VISIT (OUTPATIENT)
Dept: SURGERY | Facility: CLINIC | Age: 40
End: 2020-06-29
Payer: COMMERCIAL

## 2020-06-29 VITALS
HEART RATE: 89 BPM | HEIGHT: 69 IN | DIASTOLIC BLOOD PRESSURE: 82 MMHG | WEIGHT: 247.19 LBS | OXYGEN SATURATION: 98 % | SYSTOLIC BLOOD PRESSURE: 134 MMHG | BODY MASS INDEX: 36.61 KG/M2

## 2020-06-29 DIAGNOSIS — E78.00 HYPERCHOLESTEROLEMIA: Primary | ICD-10-CM

## 2020-06-29 DIAGNOSIS — R06.83 SNORING: ICD-10-CM

## 2020-06-29 DIAGNOSIS — E66.9 OBESITY (BMI 30-39.9): ICD-10-CM

## 2020-06-29 DIAGNOSIS — K76.0 FATTY LIVER: ICD-10-CM

## 2020-06-29 DIAGNOSIS — M47.9 DEGENERATIVE JOINT DISEASE OF LOW BACK: ICD-10-CM

## 2020-06-29 DIAGNOSIS — M54.9 BACK PAIN, UNSPECIFIED BACK LOCATION, UNSPECIFIED BACK PAIN LATERALITY, UNSPECIFIED CHRONICITY: ICD-10-CM

## 2020-06-29 DIAGNOSIS — K42.9 UMBILICAL HERNIA WITHOUT OBSTRUCTION AND WITHOUT GANGRENE: ICD-10-CM

## 2020-06-29 DIAGNOSIS — K21.9 GASTROESOPHAGEAL REFLUX DISEASE, ESOPHAGITIS PRESENCE NOT SPECIFIED: ICD-10-CM

## 2020-06-29 DIAGNOSIS — Z87.39 HISTORY OF GOUT: ICD-10-CM

## 2020-06-29 DIAGNOSIS — R73.03 PREDIABETES: ICD-10-CM

## 2020-06-29 PROCEDURE — 99214 OFFICE O/P EST MOD 30 MIN: CPT | Performed by: NURSE PRACTITIONER

## 2020-06-29 NOTE — PROGRESS NOTES
[address], [facility] [address]  [address] [facility] [address]  Dept: [phone]       Patient:  Manfred Alcazar  :      1980  MRN:      [id number]    Chief Complaint:  Patient prese pain-Improvement?  no, Joint pain-Improvement?  yes, GERD-Improvement?   yes and Hyperlipidemia-Improvement?  no    OBJECTIVE     Vitals: /82 (BP Location: Left arm, Patient Position: Sitting)   Pulse 89   Ht 5' 9\" (1.753 m)   Wt 247 lb 3.2 oz (112.1 Presybeterian service: Not on file        Active member of club or organization: Not on file        Attends meetings of clubs or organizations: Not on file        Relationship status: Not on file      Intimate partner violence:        Fear of current or ex par water per day, Maintain a daily food journal, Ulysses Riggs portion control strategies to reduce calorie intake, Identify triggers for eating and manage cues and Eat slowly and take 20 to 30 minutes to complete each meal    Exercise Goals Reviewed and Discussed apnea.     Encounter Diagnosis(ses):   Hypercholesterolemia  (primary encounter diagnosis)  Snoring  Degenerative joint disease of low back  Prediabetes  Umbilical hernia without obstruction and without gangrene  Gastroesophageal reflux disease, esophagiti minimally to non-processed diet rich in fruits, vegetables, whole grains and healthy fats, and meats/seafood without hormones, steroids, or antibiotics.      Avoid processed, poor quality carbohydrates, refined grains, flour, sugar.     Goals for next romero Oral Tablet 24 Hr Take 1 tablet (500 mg total) by mouth 2 (two) times daily with meals. 6/29/2020: Stop medication two weeks prior bariatric surgery. • [DISCONTINUED] Omega-3 Fatty Acids (FISH OIL) 1200 MG Oral Cap Take 1 capsule by mouth daily.  (Mitzi

## 2020-06-29 NOTE — PATIENT INSTRUCTIONS
Outpatient Encounter Medications as of 6/29/2020   Medication Sig Note   • metFORMIN HCl  MG Oral Tablet 24 Hr Take 1 tablet (500 mg total) by mouth 2 (two) times daily with meals. 6/29/2020: Stop medication two weeks prior bariatric surgery.     •

## 2020-07-01 ENCOUNTER — OFFICE VISIT (OUTPATIENT)
Dept: PULMONOLOGY | Facility: CLINIC | Age: 40
End: 2020-07-01
Payer: COMMERCIAL

## 2020-07-01 VITALS
BODY MASS INDEX: 35.84 KG/M2 | HEIGHT: 69 IN | SYSTOLIC BLOOD PRESSURE: 125 MMHG | WEIGHT: 242 LBS | HEART RATE: 78 BPM | RESPIRATION RATE: 18 BRPM | OXYGEN SATURATION: 96 % | DIASTOLIC BLOOD PRESSURE: 83 MMHG

## 2020-07-01 DIAGNOSIS — G47.33 OSA (OBSTRUCTIVE SLEEP APNEA): ICD-10-CM

## 2020-07-01 DIAGNOSIS — E66.09 CLASS 2 OBESITY DUE TO EXCESS CALORIES WITHOUT SERIOUS COMORBIDITY WITH BODY MASS INDEX (BMI) OF 35.0 TO 35.9 IN ADULT: Primary | ICD-10-CM

## 2020-07-01 PROCEDURE — 99244 OFF/OP CNSLTJ NEW/EST MOD 40: CPT | Performed by: INTERNAL MEDICINE

## 2020-07-01 NOTE — PROGRESS NOTES
[facility] OFFICE BUILDING, [facility], [facility]    Report of Consultation    Jose Eduardoalexander Miller Patient Status:  Outpatient    1980 MRN [id number]   Location [facility] OFFICE BUILDING, [address] [place] [address], [facility] status: Never Smoker      Smokeless tobacco: Current User        Types: Chew    Alcohol use:  Yes      Alcohol/week: 4.0 - 6.0 standard drinks      Types: 4 - 6 Cans of beer per week      Comment: socially    Drug use: No         Current Medications:  No cu study , if positive to proceed with Auto cpap therapy , then download and f/u   Diet and exercise and weight reduction  Recommend use CPAP for at least a year even after bariatric surgery if patient with significant ABIEL  Avoid sedative and narcotic and alc

## 2020-07-10 ENCOUNTER — VIRTUAL PHONE E/M (OUTPATIENT)
Dept: FAMILY MEDICINE CLINIC | Facility: CLINIC | Age: 40
End: 2020-07-10
Payer: COMMERCIAL

## 2020-07-10 VITALS — WEIGHT: 242 LBS | BODY MASS INDEX: 36 KG/M2

## 2020-07-10 DIAGNOSIS — R73.03 PREDIABETES: ICD-10-CM

## 2020-07-10 DIAGNOSIS — R79.89 ELEVATED LFTS: ICD-10-CM

## 2020-07-10 DIAGNOSIS — K76.0 FATTY LIVER: ICD-10-CM

## 2020-07-10 DIAGNOSIS — E78.00 HYPERCHOLESTEROLEMIA: ICD-10-CM

## 2020-07-10 DIAGNOSIS — E66.01 MORBID OBESITY DUE TO EXCESS CALORIES (HCC): ICD-10-CM

## 2020-07-10 DIAGNOSIS — E66.9 OBESITY (BMI 30-39.9): Primary | ICD-10-CM

## 2020-07-10 PROCEDURE — 99213 OFFICE O/P EST LOW 20 MIN: CPT | Performed by: FAMILY MEDICINE

## 2020-07-10 NOTE — PROGRESS NOTES
Virtual Telephone Check-In    Manfredkita Alcazar verbally consents to a Virtual/Telephone Check-In visit on 07/10/20. Patient has been referred to the [facility] website at [email or web address] to review the yearly Consent to Treat document.     Patient Sundar Childs Gout 2017   • History of gout 12/3/2018   • Hypercholesterolemia 6/25/2019   • Prediabetes 6/25/2019   • Snoring 3/74/5848   • Umbilical hernia without obstruction and without gangrene 6/17/2019     History reviewed. No pertinent surgical history.       REV None    Rochelle Power MD

## 2020-07-10 NOTE — PATIENT INSTRUCTIONS
Get labs now, make sure the CMP I ordered is drawn as well as all Dr. Arjun Villalobos orders, ask then to cc me too with all results, THANKS!

## 2020-07-13 ENCOUNTER — OFFICE VISIT (OUTPATIENT)
Dept: SURGERY | Facility: CLINIC | Age: 40
End: 2020-07-13
Payer: COMMERCIAL

## 2020-07-13 VITALS — BODY MASS INDEX: 36.95 KG/M2 | WEIGHT: 249.5 LBS | HEIGHT: 69 IN

## 2020-07-13 DIAGNOSIS — E66.09 CLASS 2 OBESITY DUE TO EXCESS CALORIES WITHOUT SERIOUS COMORBIDITY WITH BODY MASS INDEX (BMI) OF 36.0 TO 36.9 IN ADULT: Primary | ICD-10-CM

## 2020-07-13 PROCEDURE — 3008F BODY MASS INDEX DOCD: CPT | Performed by: DIETITIAN, REGISTERED

## 2020-07-13 PROCEDURE — 97802 MEDICAL NUTRITION INDIV IN: CPT | Performed by: DIETITIAN, REGISTERED

## 2020-07-13 NOTE — PROGRESS NOTES
[address] [place] [address] AND WE[facility] LOSS CLINIC  [address] [place] [address]  Dept: [phone]  Loc: [phone]    07/13/20    Bariatric Initial Nutrition Assessment    Danii Dumont is a 44year old male.     Referrin Samara MADRID for medical weight loss. Patient has completed medical weight management Yes    Patient has support from: Family and Primary care physician    Patient acknowledges binge eating behavior: Pt denies.     Patient engages in binge-purge beha 02/11/2020    A1C 5.5 02/11/2020       Lipid Panel: Lab Results   Component Value Date    CHOLEST 187 06/19/2019    TRIG 131 06/19/2019    HDL 47 06/19/2019     (H) 06/19/2019    VLDL 26 06/19/2019    NONHDLC 140 (H) 06/19/2019       [employer], coaches HS football and softball. Other: Pt presents w/ interest in bariatric surgery, specifically the VSG.  Pt reports the struggle with weight began after developing some chronic joint pain, has tried many different diets and medication protein diet prior to surgery  · Other: pre-op labs    Additional RD visits required to review concepts? Yes, and to monitor goals  Patient understands protein requirements?  Introduced, will reinforce  Patient understand fluid requirements (amount and meth

## 2020-07-14 ENCOUNTER — LAB ENCOUNTER (OUTPATIENT)
Dept: LAB | Facility: HOSPITAL | Age: 40
End: 2020-07-14
Attending: FAMILY MEDICINE
Payer: COMMERCIAL

## 2020-07-14 DIAGNOSIS — N20.0 KIDNEY STONE: ICD-10-CM

## 2020-07-14 DIAGNOSIS — K42.9 UMBILICAL HERNIA WITHOUT OBSTRUCTION AND WITHOUT GANGRENE: ICD-10-CM

## 2020-07-14 DIAGNOSIS — E78.00 HYPERCHOLESTEROLEMIA: Primary | ICD-10-CM

## 2020-07-14 DIAGNOSIS — K21.9 GASTROESOPHAGEAL REFLUX DISEASE, ESOPHAGITIS PRESENCE NOT SPECIFIED: ICD-10-CM

## 2020-07-14 DIAGNOSIS — Z87.39 HISTORY OF GOUT: ICD-10-CM

## 2020-07-14 DIAGNOSIS — R73.03 PREDIABETES: ICD-10-CM

## 2020-07-14 DIAGNOSIS — E66.01 MORBID OBESITY DUE TO EXCESS CALORIES (HCC): ICD-10-CM

## 2020-07-14 DIAGNOSIS — K76.0 FATTY LIVER: ICD-10-CM

## 2020-07-14 DIAGNOSIS — Z79.899 LONG-TERM USE OF HIGH-RISK MEDICATION: ICD-10-CM

## 2020-07-14 DIAGNOSIS — E78.00 HYPERCHOLESTEROLEMIA: ICD-10-CM

## 2020-07-14 DIAGNOSIS — R79.89 ELEVATED LFTS: ICD-10-CM

## 2020-07-14 DIAGNOSIS — M47.9 DEGENERATIVE JOINT DISEASE OF LOW BACK: ICD-10-CM

## 2020-07-14 DIAGNOSIS — R06.83 SNORING: ICD-10-CM

## 2020-07-14 LAB
ALBUMIN SERPL-MCNC: 4.3 G/DL
ALBUMIN SERPL-MCNC: 4.3 G/DL (ref 3.4–5)
ALBUMIN/GLOB SERPL: 1.2 {RATIO}
ALBUMIN/GLOB SERPL: 1.2 {RATIO} (ref 1–2)
ALP LIVER SERPL-CCNC: 40 U/L (ref 45–117)
ALP SERPL-CCNC: 40 U/L
ALT SERPL-CCNC: 52 U/L (ref 16–61)
ALT SERPL-CCNC: 52 UNITS/L
ANION GAP SERPL CALC-SCNC: 5 MMOL/L
ANION GAP SERPL CALC-SCNC: 5 MMOL/L (ref 0–18)
AST SERPL-CCNC: 29 U/L (ref 15–37)
AST SERPL-CCNC: 29 UNITS/L
BASOPHILS # BLD AUTO: 0.03 X10(3) UL (ref 0–0.2)
BASOPHILS NFR BLD AUTO: 0.5 %
BILIRUB SERPL-MCNC: 0.7 MG/DL
BILIRUB SERPL-MCNC: 0.7 MG/DL (ref 0.1–2)
BUN BLD-MCNC: 16 MG/DL (ref 7–18)
BUN SERPL-MCNC: 16 MG/DL
BUN/CREAT SERPL: 12.9
BUN/CREAT SERPL: 12.9 (ref 10–20)
CALCIUM BLD-MCNC: 9.5 MG/DL (ref 8.5–10.1)
CALCIUM SERPL-MCNC: 9.5 MG/DL
CHLORIDE SERPL-SCNC: 109 MMOL/L
CHLORIDE SERPL-SCNC: 109 MMOL/L (ref 98–112)
CHOLEST SERPL-MCNC: 207 MG/DL
CHOLEST SMN-MCNC: 207 MG/DL (ref ?–200)
CHOLEST/HDLC SERPL: 48 {RATIO}
CO2 SERPL-SCNC: 28 MMOL/L
CO2 SERPL-SCNC: 28 MMOL/L (ref 21–32)
CREAT BLD-MCNC: 1.24 MG/DL (ref 0.7–1.3)
CREAT SERPL-MCNC: 1.24 MG/DL
DEPRECATED HBV CORE AB SER IA-ACNC: 106 NG/ML (ref 48–420)
DEPRECATED RDW RBC AUTO: 40.4 FL (ref 35.1–46.3)
EOSINOPHIL # BLD AUTO: 0.05 X10(3) UL (ref 0–0.7)
EOSINOPHIL NFR BLD AUTO: 0.9 %
ERYTHROCYTE [DISTWIDTH] IN BLOOD BY AUTOMATED COUNT: 13.1 % (ref 11–15)
FOLATE SERPL-MCNC: 14.4 NG/ML (ref 8.7–?)
GLOBULIN PLAS-MCNC: 3.6 G/DL (ref 2.8–4.4)
GLOBULIN SER-MCNC: 3.6 G/DL
GLUCOSE BLD-MCNC: 97 MG/DL (ref 70–99)
GLUCOSE SERPL-MCNC: 97 MG/DL
HAV IGM SER QL: 2.4 MG/DL (ref 1.6–2.6)
HCT VFR BLD AUTO: 48.3 % (ref 39–53)
HCT VFR BLD CALC: 48.3 %
HDLC SERPL-MCNC: 48 MG/DL (ref 40–59)
HGB BLD-MCNC: 16.5 G/DL
HGB BLD-MCNC: 16.5 G/DL (ref 13–17.5)
IMM GRANULOCYTES # BLD AUTO: 0.02 X10(3) UL (ref 0–1)
IMM GRANULOCYTES NFR BLD: 0.4 %
IRON SATURATION: 16 % (ref 20–50)
IRON SERPL-MCNC: 73 UG/DL (ref 65–175)
LDLC SERPL CALC-MCNC: 125 MG/DL
LDLC SERPL CALC-MCNC: 125 MG/DL (ref ?–100)
LYMPHOCYTES # BLD AUTO: 1.88 X10(3) UL (ref 1–4)
LYMPHOCYTES NFR BLD AUTO: 32.9 %
M PROTEIN MFR SERPL ELPH: 7.9 G/DL (ref 6.4–8.2)
MCH RBC QN AUTO: 29.6 PG (ref 26–34)
MCHC RBC AUTO-ENTMCNC: 34.2 G/DL (ref 31–37)
MCV RBC AUTO: 86.6 FL (ref 80–100)
MONOCYTES # BLD AUTO: 0.45 X10(3) UL (ref 0.1–1)
MONOCYTES NFR BLD AUTO: 7.9 %
NEUTROPHILS # BLD AUTO: 3.28 X10 (3) UL (ref 1.5–7.7)
NEUTROPHILS # BLD AUTO: 3.28 X10(3) UL (ref 1.5–7.7)
NEUTROPHILS NFR BLD AUTO: 57.4 %
NONHDLC SERPL-MCNC: 159 MG/DL
NONHDLC SERPL-MCNC: 159 MG/DL (ref ?–130)
OSMOLALITY SERPL CALC.SUM OF ELEC: 295 MOSM/KG (ref 275–295)
PATIENT FASTING Y/N/NP: YES
PATIENT FASTING Y/N/NP: YES
PHOSPHATE SERPL-MCNC: 2.9 MG/DL (ref 2.5–4.9)
PLATELET # BLD AUTO: 262 10(3)UL (ref 150–450)
POTASSIUM SERPL-SCNC: 4.5 MMOL/L
POTASSIUM SERPL-SCNC: 4.5 MMOL/L (ref 3.5–5.1)
PROT SERPL-MCNC: 7.9 G/DL
RBC # BLD AUTO: 5.58 X10(6)UL (ref 4.3–5.7)
RBC # BLD: 5.58 10*6/UL
SODIUM SERPL-SCNC: 142 MMOL/L
SODIUM SERPL-SCNC: 142 MMOL/L (ref 136–145)
TOTAL IRON BINDING CAPACITY: 451 UG/DL (ref 240–450)
TRANSFERRIN SERPL-MCNC: 303 MG/DL (ref 200–360)
TRIGL SERPL-MCNC: 172 MG/DL
TRIGL SERPL-MCNC: 172 MG/DL (ref 30–149)
TSI SER-ACNC: 1.73 MIU/ML (ref 0.36–3.74)
VIT B12 SERPL-MCNC: 557 PG/ML (ref 193–986)
VLDLC SERPL CALC-MCNC: 34 MG/DL
VLDLC SERPL CALC-MCNC: 34 MG/DL (ref 0–30)
WBC # BLD AUTO: 5.7 X10(3) UL (ref 4–11)
WBC # BLD: 5.7 K/MCL

## 2020-07-14 PROCEDURE — 80053 COMPREHEN METABOLIC PANEL: CPT

## 2020-07-14 PROCEDURE — 82746 ASSAY OF FOLIC ACID SERUM: CPT

## 2020-07-14 PROCEDURE — 84466 ASSAY OF TRANSFERRIN: CPT

## 2020-07-14 PROCEDURE — 36415 COLL VENOUS BLD VENIPUNCTURE: CPT

## 2020-07-14 PROCEDURE — 84443 ASSAY THYROID STIM HORMONE: CPT

## 2020-07-14 PROCEDURE — 84100 ASSAY OF PHOSPHORUS: CPT

## 2020-07-14 PROCEDURE — 83735 ASSAY OF MAGNESIUM: CPT

## 2020-07-14 PROCEDURE — 80061 LIPID PANEL: CPT

## 2020-07-14 PROCEDURE — 82728 ASSAY OF FERRITIN: CPT

## 2020-07-14 PROCEDURE — 82306 VITAMIN D 25 HYDROXY: CPT

## 2020-07-14 PROCEDURE — 84425 ASSAY OF VITAMIN B-1: CPT

## 2020-07-14 PROCEDURE — 83540 ASSAY OF IRON: CPT

## 2020-07-14 PROCEDURE — 85025 COMPLETE CBC W/AUTO DIFF WBC: CPT

## 2020-07-14 PROCEDURE — 82607 VITAMIN B-12: CPT

## 2020-07-15 LAB — 25(OH)D3 SERPL-MCNC: 33 NG/ML (ref 30–100)

## 2020-07-16 RX ORDER — METFORMIN HYDROCHLORIDE 500 MG/1
500 TABLET, EXTENDED RELEASE ORAL 2 TIMES DAILY
COMMUNITY
Start: 2020-04-17

## 2020-07-16 RX ORDER — ALLOPURINOL 100 MG/1
1 TABLET ORAL DAILY
COMMUNITY
Start: 2019-08-07 | End: 2020-07-20

## 2020-07-16 RX ORDER — COLCHICINE 0.6 MG/1
1 TABLET ORAL DAILY
COMMUNITY
Start: 2019-08-07 | End: 2020-07-20

## 2020-07-17 LAB — VITAMIN B1 (THIAMINE), WHOLE B: 114 NMOL/L

## 2020-07-20 ENCOUNTER — OFFICE VISIT (OUTPATIENT)
Dept: CARDIOLOGY | Age: 40
End: 2020-07-20

## 2020-07-20 VITALS
DIASTOLIC BLOOD PRESSURE: 88 MMHG | SYSTOLIC BLOOD PRESSURE: 120 MMHG | HEIGHT: 69 IN | OXYGEN SATURATION: 94 % | WEIGHT: 244 LBS | BODY MASS INDEX: 36.14 KG/M2 | HEART RATE: 82 BPM

## 2020-07-20 DIAGNOSIS — R73.01 IFG (IMPAIRED FASTING GLUCOSE): ICD-10-CM

## 2020-07-20 DIAGNOSIS — E78.00 HYPERCHOLESTEROLEMIA: Primary | ICD-10-CM

## 2020-07-20 DIAGNOSIS — Z01.818 PRE-OP EVALUATION: ICD-10-CM

## 2020-07-20 PROCEDURE — X1094 NO CHARGE VISIT: HCPCS | Performed by: INTERNAL MEDICINE

## 2020-07-20 PROCEDURE — 99243 OFF/OP CNSLTJ NEW/EST LOW 30: CPT | Performed by: INTERNAL MEDICINE

## 2020-07-20 SDOH — HEALTH STABILITY: MENTAL HEALTH: HOW OFTEN DO YOU HAVE A DRINK CONTAINING ALCOHOL?: NOT ASKED

## 2020-07-20 ASSESSMENT — PATIENT HEALTH QUESTIONNAIRE - PHQ9
1. LITTLE INTEREST OR PLEASURE IN DOING THINGS: NOT AT ALL
2. FEELING DOWN, DEPRESSED OR HOPELESS: NOT AT ALL
CLINICAL INTERPRETATION OF PHQ2 SCORE: NO FURTHER SCREENING NEEDED
CLINICAL INTERPRETATION OF PHQ9 SCORE: NO FURTHER SCREENING NEEDED
SUM OF ALL RESPONSES TO PHQ9 QUESTIONS 1 AND 2: 0
SUM OF ALL RESPONSES TO PHQ9 QUESTIONS 1 AND 2: 0

## 2020-07-21 DIAGNOSIS — R00.2 PALPITATIONS: Primary | ICD-10-CM

## 2020-07-22 DIAGNOSIS — R00.2 PALPITATIONS: ICD-10-CM

## 2020-07-22 PROCEDURE — 93000 ELECTROCARDIOGRAM COMPLETE: CPT | Performed by: INTERNAL MEDICINE

## 2020-07-29 ENCOUNTER — OFFICE VISIT (OUTPATIENT)
Dept: SURGERY | Facility: CLINIC | Age: 40
End: 2020-07-29
Payer: COMMERCIAL

## 2020-07-29 VITALS
HEART RATE: 72 BPM | DIASTOLIC BLOOD PRESSURE: 86 MMHG | OXYGEN SATURATION: 99 % | SYSTOLIC BLOOD PRESSURE: 130 MMHG | BODY MASS INDEX: 36.29 KG/M2 | HEIGHT: 69 IN | WEIGHT: 245 LBS

## 2020-07-29 DIAGNOSIS — E66.01 MORBID OBESITY DUE TO EXCESS CALORIES (HCC): ICD-10-CM

## 2020-07-29 DIAGNOSIS — E78.00 HYPERCHOLESTEROLEMIA: Primary | ICD-10-CM

## 2020-07-29 DIAGNOSIS — E66.9 OBESITY (BMI 30-39.9): ICD-10-CM

## 2020-07-29 DIAGNOSIS — R06.83 SNORING: ICD-10-CM

## 2020-07-29 NOTE — PROGRESS NOTES
[address], [address]  [facility]  Dept: [phone]    7/29/2020   Bariatric Patient Follow-up Evaluation    Chief Complaint:  morbid obesity     History of Pres HIVES  Aspirin                 UNKNOWN    ROS:      Constitutional: negative  HEENT: negative  Respiratory: negative  Cardiovascular: negative  Gastrointestinal: negative  Genitourinary: negative  Musculoskeletal: negative  Neurological: negative allopurinol really has no good alternatives when there is a flareup. Given that steroids are contraindicated after RYGB, sleeve is the clear option.   If he has not proceeded with umbilical hernia repair by then, fixing that at the same time can be conside

## 2020-08-07 ENCOUNTER — TELEMEDICINE (OUTPATIENT)
Dept: SURGERY | Facility: CLINIC | Age: 40
End: 2020-08-07
Payer: COMMERCIAL

## 2020-08-07 VITALS — BODY MASS INDEX: 36 KG/M2 | WEIGHT: 245 LBS

## 2020-08-07 DIAGNOSIS — Z87.39 HISTORY OF GOUT: ICD-10-CM

## 2020-08-07 DIAGNOSIS — K42.9 UMBILICAL HERNIA WITHOUT OBSTRUCTION AND WITHOUT GANGRENE: ICD-10-CM

## 2020-08-07 DIAGNOSIS — R06.83 SNORING: ICD-10-CM

## 2020-08-07 DIAGNOSIS — E78.00 HYPERCHOLESTEROLEMIA: Primary | ICD-10-CM

## 2020-08-07 DIAGNOSIS — K76.0 FATTY LIVER: ICD-10-CM

## 2020-08-07 DIAGNOSIS — K21.9 GASTROESOPHAGEAL REFLUX DISEASE, ESOPHAGITIS PRESENCE NOT SPECIFIED: ICD-10-CM

## 2020-08-07 DIAGNOSIS — M47.9 DEGENERATIVE JOINT DISEASE OF LOW BACK: ICD-10-CM

## 2020-08-07 DIAGNOSIS — R73.03 PREDIABETES: ICD-10-CM

## 2020-08-07 DIAGNOSIS — G47.33 OSA (OBSTRUCTIVE SLEEP APNEA): ICD-10-CM

## 2020-08-07 DIAGNOSIS — M54.9 BACK PAIN, UNSPECIFIED BACK LOCATION, UNSPECIFIED BACK PAIN LATERALITY, UNSPECIFIED CHRONICITY: ICD-10-CM

## 2020-08-07 PROCEDURE — 99213 OFFICE O/P EST LOW 20 MIN: CPT | Performed by: NURSE PRACTITIONER

## 2020-08-07 NOTE — PROGRESS NOTES
Virtual Video/Telephone Check-In    Noble Oliver verbally consents to a [facility] [employer] [facility] visit on 08/07/20. Patient has been referred to the [facility] website at [email or web address] to review the yearly Consent to Treat document.     Angela Fatty liver 6/25/2019   • Gastroesophageal reflux disease 4/27/2020   • Gout 2017   • History of gout 12/3/2018   • Hypercholesterolemia 6/25/2019   • Prediabetes 6/25/2019   • Snoring 9/77/8248   • Umbilical hernia without obstruction and without gangrene file        Minutes per session: Not on file      Stress: Not on file    Relationships      Social connections:        Talks on phone: Not on file        Gets together: Not on file        Attends Yarsanism service: Not on file        Active member of club Modifications Reviewed and Discussed  Eat breakfast, Eat 3 meals per day, Plan meals in advance, Read nutrition labels, Drink 64 oz of water per day, Maintain a daily food journal, Utlize portion control strategies to reduce calorie intake, Identify trigge    Fatty Liver Disease: 7/16/19 CT of abdomen: Hepatic steatosis.     ABIEL: Likely diagnosis per pulmonologist. Planning sleep study.       Encounter Diagnosis(ses):   Hypercholesterolemia  (primary encounter diagnosis)  Snoring  Prediabetes  Fatty liver intake to no more than 2,400 mg/day, and at least 150 minutes of moderate physical activity per week.    Reviewed the importance of whole food, plant based, minimally to non-processed diet rich in fruits, vegetables, whole grains and healthy fats, and meats 8/7/2020.       RTC 6 weeks or 3 months after bariatric surgery whichever comes first.     MERCY Ramires

## 2020-08-12 ENCOUNTER — OFFICE VISIT (OUTPATIENT)
Dept: SURGERY | Facility: CLINIC | Age: 40
End: 2020-08-12

## 2020-08-12 VITALS — HEIGHT: 69 IN | BODY MASS INDEX: 35.58 KG/M2 | WEIGHT: 240.19 LBS

## 2020-08-12 DIAGNOSIS — E66.01 CLASS 2 SEVERE OBESITY DUE TO EXCESS CALORIES WITH SERIOUS COMORBIDITY AND BODY MASS INDEX (BMI) OF 35.0 TO 35.9 IN ADULT (HCC): Primary | ICD-10-CM

## 2020-08-12 PROCEDURE — 0358T BIA WHOLE BODY: CPT | Performed by: DIETITIAN, REGISTERED

## 2020-08-12 PROCEDURE — 3008F BODY MASS INDEX DOCD: CPT | Performed by: DIETITIAN, REGISTERED

## 2020-08-12 PROCEDURE — 97803 MED NUTRITION INDIV SUBSEQ: CPT | Performed by: DIETITIAN, REGISTERED

## 2020-08-12 NOTE — PROGRESS NOTES
[facility] AND [facility] CLINIC  [address] [place] [address]  Dept: [phone]  Loc: [phone]    08/12/20      Bariatric Follow-up Nutrition Session    Felipa Olivares is a 44year old male.      Assess (H) 07/14/2020        Vitamins/Minerals:  Lab Results   Component Value Date    B12 557 07/14/2020     Lab Results   Component Value Date    VITD 33.0 07/14/2020     Lab Results   Component Value Date/Time    THIAMINE 114 07/14/2020 09:50 AM      No result spread protein intake throughout the day, include produce with each meal, and avoid skipping meals. Exercise is a consistent 30 minutes per day of cardio, rec pt consider adding resistance training per most recent body comp results.     Body Composition Peggy

## 2020-08-26 ENCOUNTER — OFFICE VISIT (OUTPATIENT)
Dept: SURGERY | Facility: CLINIC | Age: 40
End: 2020-08-26
Payer: COMMERCIAL

## 2020-08-26 VITALS
WEIGHT: 240.69 LBS | HEART RATE: 95 BPM | OXYGEN SATURATION: 96 % | SYSTOLIC BLOOD PRESSURE: 135 MMHG | DIASTOLIC BLOOD PRESSURE: 91 MMHG | BODY MASS INDEX: 35.65 KG/M2 | HEIGHT: 69 IN

## 2020-08-26 DIAGNOSIS — R06.83 SNORING: ICD-10-CM

## 2020-08-26 DIAGNOSIS — N62 GYNECOMASTIA: ICD-10-CM

## 2020-08-26 DIAGNOSIS — E78.00 HYPERCHOLESTEROLEMIA: Primary | ICD-10-CM

## 2020-08-26 DIAGNOSIS — R73.03 PREDIABETES: ICD-10-CM

## 2020-08-26 DIAGNOSIS — R79.89 ELEVATED LFTS: ICD-10-CM

## 2020-08-26 DIAGNOSIS — Z87.39 HISTORY OF GOUT: ICD-10-CM

## 2020-08-26 DIAGNOSIS — E66.01 MORBID OBESITY DUE TO EXCESS CALORIES (HCC): ICD-10-CM

## 2020-08-26 DIAGNOSIS — K42.9 UMBILICAL HERNIA WITHOUT OBSTRUCTION AND WITHOUT GANGRENE: ICD-10-CM

## 2020-08-26 DIAGNOSIS — K76.0 FATTY LIVER: ICD-10-CM

## 2020-08-26 DIAGNOSIS — M54.9 BACK PAIN, UNSPECIFIED BACK LOCATION, UNSPECIFIED BACK PAIN LATERALITY, UNSPECIFIED CHRONICITY: ICD-10-CM

## 2020-08-26 DIAGNOSIS — J98.11 ATELECTASIS: ICD-10-CM

## 2020-08-26 DIAGNOSIS — K21.9 GASTROESOPHAGEAL REFLUX DISEASE, ESOPHAGITIS PRESENCE NOT SPECIFIED: ICD-10-CM

## 2020-08-26 PROBLEM — G47.33 OSA (OBSTRUCTIVE SLEEP APNEA): Status: RESOLVED | Noted: 2020-08-07 | Resolved: 2020-08-26

## 2020-08-26 NOTE — PROGRESS NOTES
[address], [address]  [facility]  Dept: [phone]    8/26/2020   Bariatric Patient Follow-up Evaluation    Chief Complaint:  morbid obesity     History of Pres UNKNOWN    ROS:      Constitutional: negative  HEENT: negative  Respiratory: negative  Cardiovascular: negative  Gastrointestinal: negative  Genitourinary: negative  Musculoskeletal: negative  Neurological: negative  Psychological: negative  Endocrine: neg when there is a flareup. Given that steroids are contraindicated after RYGB, sleeve is the clear option. If he has not proceeded with umbilical hernia repair by then, fixing that at the same time can be considered as well.   I am targeting a surgery date

## 2020-09-01 ENCOUNTER — TELEPHONE (OUTPATIENT)
Dept: SURGERY | Facility: CLINIC | Age: 40
End: 2020-09-01

## 2020-09-01 NOTE — TELEPHONE ENCOUNTER
----- Message from Mary Marc RD sent at 8/31/2020  1:07 PM CDT -----  Regarding: Liquid protein  Juanjose Pearson Pls schedule a liquid protein before 9/26. Pt is pended for 10/26 and has a pre-op w/ [facility] on 9/8. Krystina piper let us know if you did not feel pt is ready for surgery after you see him.     Thank you,    Aliyah Bartlett

## 2020-09-08 ENCOUNTER — OFFICE VISIT (OUTPATIENT)
Dept: SURGERY | Facility: CLINIC | Age: 40
End: 2020-09-08
Payer: COMMERCIAL

## 2020-09-08 VITALS — BODY MASS INDEX: 35.6 KG/M2 | WEIGHT: 240.38 LBS | HEIGHT: 69 IN

## 2020-09-08 DIAGNOSIS — E66.01 CLASS 2 SEVERE OBESITY DUE TO EXCESS CALORIES WITH SERIOUS COMORBIDITY AND BODY MASS INDEX (BMI) OF 35.0 TO 35.9 IN ADULT (HCC): Primary | ICD-10-CM

## 2020-09-08 PROCEDURE — 97803 MED NUTRITION INDIV SUBSEQ: CPT | Performed by: DIETITIAN, REGISTERED

## 2020-09-08 PROCEDURE — 3008F BODY MASS INDEX DOCD: CPT | Performed by: DIETITIAN, REGISTERED

## 2020-09-08 NOTE — PROGRESS NOTES
[address] [facility] AND [facility] CLIN[facility]  [address] [place] [address]  Dept: [phone]  Loc: [phone]    09/08/20      Bariatric Follow-up Nutrition Session    Stephan Allen is a 44year old male.      Assess Joceline 159 (H) 07/14/2020        Vitamins/Minerals:  Lab Results   Component Value Date    B12 557 07/14/2020     Lab Results   Component Value Date    VITD 33.0 07/14/2020     Lab Results   Component Value Date/Time    THIAMINE 114 07/14/2020 09:50 AM and diet quality has improved tremendously. Fluid intake remains high and pt has eliminated caffeine. Bariatric MVs have been purchased but pt is not yet taking. Exercise is a consistent 30 minutes per day of cardio + some lifting several days per week.  Wi

## 2020-09-10 ENCOUNTER — OFFICE VISIT (OUTPATIENT)
Dept: SURGERY | Facility: CLINIC | Age: 40
End: 2020-09-10
Payer: COMMERCIAL

## 2020-09-10 VITALS — HEIGHT: 69 IN | BODY MASS INDEX: 35.6 KG/M2 | WEIGHT: 240.38 LBS

## 2020-09-10 DIAGNOSIS — E66.01 CLASS 2 SEVERE OBESITY DUE TO EXCESS CALORIES WITH SERIOUS COMORBIDITY AND BODY MASS INDEX (BMI) OF 35.0 TO 35.9 IN ADULT (HCC): Primary | ICD-10-CM

## 2020-09-10 PROCEDURE — 97803 MED NUTRITION INDIV SUBSEQ: CPT | Performed by: DIETITIAN, REGISTERED

## 2020-09-10 PROCEDURE — 3008F BODY MASS INDEX DOCD: CPT | Performed by: DIETITIAN, REGISTERED

## 2020-09-10 NOTE — PROGRESS NOTES
[address] [place] [address] AND WEIGHT LOSS CLINIC  [address] [place] [address]  Dept: [phone]  Loc: [phone]    09/10/20    Bariatric Liquid Protein Nutrition Session    Edilma Song is a 44year old male    Asses Component Value Date    .0 07/14/2020    .0 06/19/2019    .0 06/14/2019        Diabetes:    HGBA1C:    Lab Results   Component Value Date    A1C 5.5 02/11/2020    A1C 5.7 (H) 06/19/2019    A1C 5.5 12/03/2018     02/11/2020 days per week    Other:  Pt continues to do well w/ making good food choices and improving diet quality, feels ready for surgery.  Pt instructed to begin the LPD 10/12/2020 and drink 5-6 Ensure high protein shakes per day with 64 oz or more of clear liquids

## 2020-09-30 ENCOUNTER — OFFICE VISIT (OUTPATIENT)
Dept: SURGERY | Facility: CLINIC | Age: 40
End: 2020-09-30
Payer: COMMERCIAL

## 2020-09-30 VITALS
OXYGEN SATURATION: 100 % | WEIGHT: 239 LBS | HEIGHT: 69 IN | SYSTOLIC BLOOD PRESSURE: 129 MMHG | HEART RATE: 85 BPM | BODY MASS INDEX: 35.4 KG/M2 | DIASTOLIC BLOOD PRESSURE: 81 MMHG

## 2020-09-30 DIAGNOSIS — K21.9 GASTROESOPHAGEAL REFLUX DISEASE, ESOPHAGITIS PRESENCE NOT SPECIFIED: ICD-10-CM

## 2020-09-30 DIAGNOSIS — E78.00 HYPERCHOLESTEROLEMIA: Primary | ICD-10-CM

## 2020-09-30 DIAGNOSIS — K76.0 FATTY LIVER: ICD-10-CM

## 2020-09-30 DIAGNOSIS — E66.01 MORBID OBESITY DUE TO EXCESS CALORIES (HCC): ICD-10-CM

## 2020-09-30 DIAGNOSIS — K42.9 UMBILICAL HERNIA WITHOUT OBSTRUCTION AND WITHOUT GANGRENE: ICD-10-CM

## 2020-09-30 NOTE — PROGRESS NOTES
[address] [facility], [address] [address]  Dept: [phone]    9/30/2020   Bariatric Patient Follow-up Evaluation    Chief Complaint:  morbid obesity, preop 239     Hist HIVES  Aspirin                 UNKNOWN    ROS:      Constitutional: negative  HEENT: negative  Respiratory: negative  Cardiovascular: negative  Gastrointestinal: negative  Genitourinary: negative  Musculoskeletal: negative  Neurological: negative Patient has completed the routine pre-operative cardiac, pulmonary, nutrition, and psychology evaluations. I have no further concerns.   Risks of the surgery including but not limited to bleeding, infection, VTE, leak, marginal ulceration, hernias, hernia

## 2020-10-04 ENCOUNTER — PATIENT MESSAGE (OUTPATIENT)
Dept: FAMILY MEDICINE CLINIC | Facility: CLINIC | Age: 40
End: 2020-10-04

## 2020-10-05 ENCOUNTER — TELEPHONE (OUTPATIENT)
Dept: FAMILY MEDICINE CLINIC | Facility: CLINIC | Age: 40
End: 2020-10-05

## 2020-10-08 NOTE — TELEPHONE ENCOUNTER
Left message for [facility]. Asked her to call me back at our number in order to get the information that is needed by her.

## 2020-10-12 ENCOUNTER — TELEPHONE (OUTPATIENT)
Dept: FAMILY MEDICINE CLINIC | Facility: CLINIC | Age: 40
End: 2020-10-12

## 2020-10-12 NOTE — TELEPHONE ENCOUNTER
bcbs needs to know what pt's last A1C is and microalbumiurea, when patient was diagnosed with diabetise.

## 2020-10-23 ENCOUNTER — LAB ENCOUNTER (OUTPATIENT)
Dept: LAB | Facility: HOSPITAL | Age: 40
End: 2020-10-23
Attending: SINGLE SPECIALTY
Payer: COMMERCIAL

## 2020-10-23 DIAGNOSIS — Z01.818 PREOP TESTING: ICD-10-CM

## 2020-10-23 DIAGNOSIS — E78.00 HYPERCHOLESTEROLEMIA: ICD-10-CM

## 2020-10-23 PROCEDURE — 86901 BLOOD TYPING SEROLOGIC RH(D): CPT

## 2020-10-23 PROCEDURE — 86900 BLOOD TYPING SEROLOGIC ABO: CPT

## 2020-10-23 PROCEDURE — 86850 RBC ANTIBODY SCREEN: CPT

## 2020-10-23 PROCEDURE — 36415 COLL VENOUS BLD VENIPUNCTURE: CPT

## 2020-10-23 NOTE — PAT NURSING NOTE
Spoke with Santos Ron re adding hernia repair to consent, will refax scheduling ticket to PAT office.

## 2020-10-26 ENCOUNTER — ANESTHESIA (OUTPATIENT)
Dept: SURGERY | Facility: HOSPITAL | Age: 40
DRG: 621 | End: 2020-10-26
Payer: COMMERCIAL

## 2020-10-26 ENCOUNTER — ANESTHESIA EVENT (OUTPATIENT)
Dept: SURGERY | Facility: HOSPITAL | Age: 40
DRG: 621 | End: 2020-10-26
Payer: COMMERCIAL

## 2020-10-26 ENCOUNTER — HOSPITAL ENCOUNTER (INPATIENT)
Facility: HOSPITAL | Age: 40
LOS: 3 days | Discharge: HOME | DRG: 621 | End: 2020-10-29
Attending: SINGLE SPECIALTY | Admitting: SINGLE SPECIALTY
Payer: COMMERCIAL

## 2020-10-26 ENCOUNTER — DOCUMENTATION ONLY (OUTPATIENT)
Dept: SURGERY | Facility: CLINIC | Age: 40
End: 2020-10-26

## 2020-10-26 DIAGNOSIS — Z01.818 PREOP TESTING: Primary | ICD-10-CM

## 2020-10-26 DIAGNOSIS — K21.9 GASTROESOPHAGEAL REFLUX DISEASE, UNSPECIFIED WHETHER ESOPHAGITIS PRESENT: ICD-10-CM

## 2020-10-26 DIAGNOSIS — E66.9 OBESITY (BMI 30-39.9): ICD-10-CM

## 2020-10-26 DIAGNOSIS — E78.00 HYPERCHOLESTEROLEMIA: ICD-10-CM

## 2020-10-26 DIAGNOSIS — R73.03 PREDIABETES: ICD-10-CM

## 2020-10-26 DIAGNOSIS — E66.01 MORBID OBESITY DUE TO EXCESS CALORIES (HCC): ICD-10-CM

## 2020-10-26 PROCEDURE — 99232 SBSQ HOSP IP/OBS MODERATE 35: CPT | Performed by: HOSPITALIST

## 2020-10-26 PROCEDURE — 0DB64Z3 EXCISION OF STOMACH, PERCUTANEOUS ENDOSCOPIC APPROACH, VERTICAL: ICD-10-PCS | Performed by: SINGLE SPECIALTY

## 2020-10-26 PROCEDURE — 0WQF4ZZ REPAIR ABDOMINAL WALL, PERCUTANEOUS ENDOSCOPIC APPROACH: ICD-10-PCS | Performed by: SINGLE SPECIALTY

## 2020-10-26 RX ORDER — HEPARIN SODIUM 5000 [USP'U]/ML
5000 INJECTION, SOLUTION INTRAVENOUS; SUBCUTANEOUS EVERY 8 HOURS SCHEDULED
Status: DISCONTINUED | OUTPATIENT
Start: 2020-10-26 | End: 2020-10-29

## 2020-10-26 RX ORDER — ACETAMINOPHEN 10 MG/ML
1000 INJECTION, SOLUTION INTRAVENOUS EVERY 6 HOURS
Status: DISPENSED | OUTPATIENT
Start: 2020-10-26 | End: 2020-10-27

## 2020-10-26 RX ORDER — MORPHINE SULFATE 4 MG/ML
INJECTION, SOLUTION INTRAMUSCULAR; INTRAVENOUS
Status: COMPLETED
Start: 2020-10-26 | End: 2020-10-26

## 2020-10-26 RX ORDER — NALOXONE HYDROCHLORIDE 0.4 MG/ML
80 INJECTION, SOLUTION INTRAMUSCULAR; INTRAVENOUS; SUBCUTANEOUS AS NEEDED
Status: DISCONTINUED | OUTPATIENT
Start: 2020-10-26 | End: 2020-10-26 | Stop reason: HOSPADM

## 2020-10-26 RX ORDER — MORPHINE SULFATE 10 MG/ML
6 INJECTION, SOLUTION INTRAMUSCULAR; INTRAVENOUS EVERY 10 MIN PRN
Status: DISCONTINUED | OUTPATIENT
Start: 2020-10-26 | End: 2020-10-26 | Stop reason: HOSPADM

## 2020-10-26 RX ORDER — ONDANSETRON 2 MG/ML
INJECTION INTRAMUSCULAR; INTRAVENOUS AS NEEDED
Status: DISCONTINUED | OUTPATIENT
Start: 2020-10-26 | End: 2020-10-26 | Stop reason: SURG

## 2020-10-26 RX ORDER — HYDROMORPHONE HYDROCHLORIDE 1 MG/ML
0.4 INJECTION, SOLUTION INTRAMUSCULAR; INTRAVENOUS; SUBCUTANEOUS EVERY 5 MIN PRN
Status: DISCONTINUED | OUTPATIENT
Start: 2020-10-26 | End: 2020-10-26 | Stop reason: HOSPADM

## 2020-10-26 RX ORDER — HYDROCODONE BITARTRATE AND ACETAMINOPHEN 5; 325 MG/1; MG/1
2 TABLET ORAL AS NEEDED
Status: DISCONTINUED | OUTPATIENT
Start: 2020-10-26 | End: 2020-10-26 | Stop reason: HOSPADM

## 2020-10-26 RX ORDER — SODIUM CHLORIDE, SODIUM LACTATE, POTASSIUM CHLORIDE, CALCIUM CHLORIDE 600; 310; 30; 20 MG/100ML; MG/100ML; MG/100ML; MG/100ML
INJECTION, SOLUTION INTRAVENOUS CONTINUOUS
Status: DISCONTINUED | OUTPATIENT
Start: 2020-10-26 | End: 2020-10-29

## 2020-10-26 RX ORDER — CEFAZOLIN SODIUM/WATER 2 G/20 ML
2 SYRINGE (ML) INTRAVENOUS ONCE
Status: COMPLETED | OUTPATIENT
Start: 2020-10-26 | End: 2020-10-26

## 2020-10-26 RX ORDER — PROCHLORPERAZINE EDISYLATE 5 MG/ML
5 INJECTION INTRAMUSCULAR; INTRAVENOUS ONCE AS NEEDED
Status: DISCONTINUED | OUTPATIENT
Start: 2020-10-26 | End: 2020-10-26 | Stop reason: HOSPADM

## 2020-10-26 RX ORDER — MORPHINE SULFATE 4 MG/ML
4 INJECTION, SOLUTION INTRAMUSCULAR; INTRAVENOUS EVERY 2 HOUR PRN
Status: DISCONTINUED | OUTPATIENT
Start: 2020-10-26 | End: 2020-10-29

## 2020-10-26 RX ORDER — ONDANSETRON 2 MG/ML
4 INJECTION INTRAMUSCULAR; INTRAVENOUS ONCE AS NEEDED
Status: COMPLETED | OUTPATIENT
Start: 2020-10-26 | End: 2020-10-26

## 2020-10-26 RX ORDER — ONDANSETRON 2 MG/ML
4 INJECTION INTRAMUSCULAR; INTRAVENOUS EVERY 6 HOURS PRN
Status: DISCONTINUED | OUTPATIENT
Start: 2020-10-26 | End: 2020-10-27

## 2020-10-26 RX ORDER — DEXAMETHASONE SODIUM PHOSPHATE 4 MG/ML
VIAL (ML) INJECTION AS NEEDED
Status: DISCONTINUED | OUTPATIENT
Start: 2020-10-26 | End: 2020-10-26 | Stop reason: SURG

## 2020-10-26 RX ORDER — MORPHINE SULFATE 2 MG/ML
2 INJECTION, SOLUTION INTRAMUSCULAR; INTRAVENOUS EVERY 2 HOUR PRN
Status: DISCONTINUED | OUTPATIENT
Start: 2020-10-26 | End: 2020-10-29

## 2020-10-26 RX ORDER — MORPHINE SULFATE 4 MG/ML
4 INJECTION, SOLUTION INTRAMUSCULAR; INTRAVENOUS EVERY 10 MIN PRN
Status: DISCONTINUED | OUTPATIENT
Start: 2020-10-26 | End: 2020-10-26 | Stop reason: HOSPADM

## 2020-10-26 RX ORDER — HYDROMORPHONE HYDROCHLORIDE 1 MG/ML
0.6 INJECTION, SOLUTION INTRAMUSCULAR; INTRAVENOUS; SUBCUTANEOUS EVERY 5 MIN PRN
Status: DISCONTINUED | OUTPATIENT
Start: 2020-10-26 | End: 2020-10-26 | Stop reason: HOSPADM

## 2020-10-26 RX ORDER — PANTOPRAZOLE SODIUM 40 MG/1
40 TABLET, DELAYED RELEASE ORAL
Status: DISCONTINUED | OUTPATIENT
Start: 2020-10-27 | End: 2020-10-29

## 2020-10-26 RX ORDER — SODIUM CHLORIDE, SODIUM LACTATE, POTASSIUM CHLORIDE, CALCIUM CHLORIDE 600; 310; 30; 20 MG/100ML; MG/100ML; MG/100ML; MG/100ML
INJECTION, SOLUTION INTRAVENOUS CONTINUOUS
Status: DISCONTINUED | OUTPATIENT
Start: 2020-10-26 | End: 2020-10-26 | Stop reason: HOSPADM

## 2020-10-26 RX ORDER — HYDRALAZINE HYDROCHLORIDE 20 MG/ML
10 INJECTION INTRAMUSCULAR; INTRAVENOUS ONCE
Status: COMPLETED | OUTPATIENT
Start: 2020-10-26 | End: 2020-10-26

## 2020-10-26 RX ORDER — MAGNESIUM HYDROXIDE 1200 MG/15ML
LIQUID ORAL CONTINUOUS PRN
Status: COMPLETED | OUTPATIENT
Start: 2020-10-26 | End: 2020-10-26

## 2020-10-26 RX ORDER — MORPHINE SULFATE 4 MG/ML
2 INJECTION, SOLUTION INTRAMUSCULAR; INTRAVENOUS EVERY 10 MIN PRN
Status: DISCONTINUED | OUTPATIENT
Start: 2020-10-26 | End: 2020-10-26 | Stop reason: HOSPADM

## 2020-10-26 RX ORDER — EPHEDRINE SULFATE 50 MG/ML
INJECTION, SOLUTION INTRAVENOUS AS NEEDED
Status: DISCONTINUED | OUTPATIENT
Start: 2020-10-26 | End: 2020-10-26 | Stop reason: SURG

## 2020-10-26 RX ORDER — GABAPENTIN 300 MG/1
300 CAPSULE ORAL ONCE
Status: COMPLETED | OUTPATIENT
Start: 2020-10-26 | End: 2020-10-26

## 2020-10-26 RX ORDER — MORPHINE SULFATE 2 MG/ML
1 INJECTION, SOLUTION INTRAMUSCULAR; INTRAVENOUS EVERY 2 HOUR PRN
Status: DISCONTINUED | OUTPATIENT
Start: 2020-10-26 | End: 2020-10-29

## 2020-10-26 RX ORDER — GLYCOPYRROLATE 0.2 MG/ML
INJECTION, SOLUTION INTRAMUSCULAR; INTRAVENOUS AS NEEDED
Status: DISCONTINUED | OUTPATIENT
Start: 2020-10-26 | End: 2020-10-26 | Stop reason: SURG

## 2020-10-26 RX ORDER — LABETALOL HYDROCHLORIDE 5 MG/ML
10 INJECTION, SOLUTION INTRAVENOUS ONCE
Status: COMPLETED | OUTPATIENT
Start: 2020-10-26 | End: 2020-10-26

## 2020-10-26 RX ORDER — ACETAMINOPHEN 500 MG
1000 TABLET ORAL ONCE
Status: COMPLETED | OUTPATIENT
Start: 2020-10-26 | End: 2020-10-26

## 2020-10-26 RX ORDER — HYDROMORPHONE HYDROCHLORIDE 1 MG/ML
0.2 INJECTION, SOLUTION INTRAMUSCULAR; INTRAVENOUS; SUBCUTANEOUS EVERY 5 MIN PRN
Status: DISCONTINUED | OUTPATIENT
Start: 2020-10-26 | End: 2020-10-26 | Stop reason: HOSPADM

## 2020-10-26 RX ORDER — FAMOTIDINE 20 MG/1
20 TABLET ORAL ONCE
Status: COMPLETED | OUTPATIENT
Start: 2020-10-26 | End: 2020-10-26

## 2020-10-26 RX ORDER — HYDROCODONE BITARTRATE AND ACETAMINOPHEN 5; 325 MG/1; MG/1
1 TABLET ORAL AS NEEDED
Status: DISCONTINUED | OUTPATIENT
Start: 2020-10-26 | End: 2020-10-26 | Stop reason: HOSPADM

## 2020-10-26 RX ORDER — HALOPERIDOL 5 MG/ML
0.25 INJECTION INTRAMUSCULAR ONCE AS NEEDED
Status: DISCONTINUED | OUTPATIENT
Start: 2020-10-26 | End: 2020-10-26 | Stop reason: HOSPADM

## 2020-10-26 RX ORDER — METOCLOPRAMIDE 10 MG/1
10 TABLET ORAL ONCE
Status: COMPLETED | OUTPATIENT
Start: 2020-10-26 | End: 2020-10-26

## 2020-10-26 RX ORDER — ROCURONIUM BROMIDE 10 MG/ML
INJECTION, SOLUTION INTRAVENOUS AS NEEDED
Status: DISCONTINUED | OUTPATIENT
Start: 2020-10-26 | End: 2020-10-26 | Stop reason: SURG

## 2020-10-26 RX ORDER — HEPARIN SODIUM 5000 [USP'U]/ML
5000 INJECTION, SOLUTION INTRAVENOUS; SUBCUTANEOUS ONCE
Status: COMPLETED | OUTPATIENT
Start: 2020-10-26 | End: 2020-10-26

## 2020-10-26 RX ADMIN — SODIUM CHLORIDE, SODIUM LACTATE, POTASSIUM CHLORIDE, CALCIUM CHLORIDE: 600; 310; 30; 20 INJECTION, SOLUTION INTRAVENOUS at 10:52:00

## 2020-10-26 RX ADMIN — ROCURONIUM BROMIDE 35 MG: 10 INJECTION, SOLUTION INTRAVENOUS at 09:59:00

## 2020-10-26 RX ADMIN — DEXAMETHASONE SODIUM PHOSPHATE 10 MG: 4 MG/ML VIAL (ML) INJECTION at 10:09:00

## 2020-10-26 RX ADMIN — GLYCOPYRROLATE 0.1 MG: 0.2 INJECTION, SOLUTION INTRAMUSCULAR; INTRAVENOUS at 10:09:00

## 2020-10-26 RX ADMIN — SODIUM CHLORIDE, SODIUM LACTATE, POTASSIUM CHLORIDE, CALCIUM CHLORIDE: 600; 310; 30; 20 INJECTION, SOLUTION INTRAVENOUS at 10:45:00

## 2020-10-26 RX ADMIN — EPHEDRINE SULFATE 5 MG: 50 INJECTION, SOLUTION INTRAVENOUS at 10:23:00

## 2020-10-26 RX ADMIN — ROCURONIUM BROMIDE 5 MG: 10 INJECTION, SOLUTION INTRAVENOUS at 10:27:00

## 2020-10-26 RX ADMIN — CEFAZOLIN SODIUM/WATER 2 G: 2 G/20 ML SYRINGE (ML) INTRAVENOUS at 09:58:00

## 2020-10-26 RX ADMIN — EPHEDRINE SULFATE 5 MG: 50 INJECTION, SOLUTION INTRAVENOUS at 10:25:00

## 2020-10-26 RX ADMIN — ONDANSETRON 4 MG: 2 INJECTION INTRAMUSCULAR; INTRAVENOUS at 10:45:00

## 2020-10-26 NOTE — ANESTHESIA PROCEDURE NOTES
Airway  Date/Time: 10/26/2020 10:09 AM  Urgency: Elective      General Information and Staff    Patient location during procedure: OR  Anesthesiologist: Phuc Steven MD  Performed: anesthesiologist     Indications and Patient Condition  Indications for a

## 2020-10-26 NOTE — PROGRESS NOTES
Inpatient Bariatric Nutrition Note      Steve Pfeiffer is a 44year old male.     Procedure: Sleeve gastrectomy  Surgery Date: 10/26/2020  Medical Diagnosis: Morbid obesity    Height:  Ht Readings from Last 1 Encounters:  10/26/20 : 5' 9.75\" (1.772 m) 40 mg, 40 mg, Oral, QAM AC  •  acetaminophen (OFIRMEV) infusion 1,000 mg, 1,000 mg, Intravenous, Q6H  •  morphINE sulfate (PF) 2 MG/ML injection 1 mg, 1 mg, Intravenous, Q2H PRN **OR** morphINE sulfate (PF) 2 MG/ML injection 2 mg, 2 mg, Intravenous, Q2H OR

## 2020-10-26 NOTE — OPERATIVE REPORT
Yakov Salas / Kita Ram / Kim Schneider / Kierra Quan / Shakira Bashir / Carolyn Gaytan - [phone]  Answering Service [phone]        Date: 10/26/2020  Preop Diagnosis: Morbid Obesity secondary to excess calories   Postop brought to the operating room and placed under general anesthesia. Preoperative antibiotics and heparin were given and was secured to the operating room table. Prepped and draped in a sterile fashion.   An Optiview technique was used to insert an 11 mm le corkscrewing of the sleeve along its path. We completed our sleeve with a total of 2 black loads and 3 green loads, taking care to ensure that we did not leave a significant posterior fundic pouch.       We ensured that there was excellent hemostasis along

## 2020-10-26 NOTE — PROGRESS NOTES
Provided/reviewed bariatric discharge instructions; stressed importance of fluids, aim for 48-64 ozs/day; caring for incisions; signs and symptoms of concern, when to call surgeon and when to go to ED or call 911; activities allowed and to avoid; pt had ju

## 2020-10-26 NOTE — ANESTHESIA POSTPROCEDURE EVALUATION
Patient: Chin Guevara    Procedure Summary     Date: 10/26/20 Room / Location: [facility] MAIN OR  / [facility] MAIN OR    Anesthesia Start: 0404 Anesthesia Stop: 0527    Procedure: BARIATRIC SLEEVE GASTRECTOMY LAPAROSCOPIC (N/A ) Diagnosis: (morbid obesity, gerd, h

## 2020-10-26 NOTE — PROGRESS NOTES
[facility]    Progress Note    Danii Dumont Patient Status:  Inpatient    1980 MRN [id number]   Location [address] [facility] Attending Cuauhtemoc Spencer MD   Hosp Day # 0 PCP Monroe Verma MD Lab Results   Component Value Date    WBC 5.7 07/14/2020    HGB 16.5 07/14/2020    HCT 48.3 07/14/2020    .0 07/14/2020    CREATSERUM 1.24 07/14/2020    BUN 16 07/14/2020     07/14/2020    K 4.5 07/14/2020     07/14/2020    CO2 28.0

## 2020-10-26 NOTE — H&P
Ramona Harrison / Joseph Colorado / Tera Fry / Lee Dillard / Sharda Daniel / Kierra Tavarez - [phone]  Answering Service [phone]      Julissa Orta Patient Status:  Inpatient    1980 MRN [id number]   Location [facility] Snoring 3/67/4870   • Umbilical hernia without obstruction and without gangrene 6/17/2019      Past Surgical History:   Procedure Laterality Date   • APPENDECTOMY     • OTHER Left     Tendon repair left wrist        •  metFORMIN HCl  MG Oral Tablet 2 Component Value Date    CO2 28.0 07/14/2020    BUN 16 07/14/2020       Assessment/Plan: Morbid obesity with failure of conservative therapy. Pt has opted for sleeve gastrectomy.     The patient was informed that risks include, but are not limited to: corinna

## 2020-10-26 NOTE — PLAN OF CARE
Received patient from PACU, alert and oriented x 3, RA. lap sites c/d/I.  dermabreji CAMPOS. Morphine given for pain currently.  Patient educated on bariatric diet, and monitoring water intake as well as the importance of ambulating frequently to relieve  Gas p

## 2020-10-26 NOTE — RESPIRATORY THERAPY NOTE
Jamal Craig ABIEL ASSESSMENT:    Pt does not have a previous diagnosis of ABIEL. Pt does not routinely use a CPAP device at home.  T    CPAP INITIATION:    Pt to be placed on CPAP: no  Pt refused: yes

## 2020-10-26 NOTE — ANESTHESIA PREPROCEDURE EVALUATION
Anesthesia PreOp Note    HPI:     Bonny Arreguin is a 44year old male who presents for preoperative consultation requested by:  Joanna Whitney MD    Date of Surgery: 10/26/2020    Procedure(s):  BARIATRIC SLEEVE GASTRECTOMY LAPAROSCOPIC  Indication: morbid o History of gout 12/3/2018   • Hypercholesterolemia 6/25/2019   • Prediabetes 6/25/2019   • Snoring 9/99/7764   • Umbilical hernia without obstruction and without gangrene 6/17/2019       Past Surgical History:   Procedure Laterality Date   • APPENDECTOMY session: Not on file      Stress: Not on file    Relationships      Social connections        Talks on phone: Not on file        Gets together: Not on file        Attends Mos[employer] service: Not on file        Active member of club or organization: Not on f toxicity vs allergy reaction. .  [place] discussed.   Informed Consent Plan and Risks Discussed With:  Patient  Discussed plan with:  CRNA      I have informed Siena Casandra and/or legal guardian or family member of the nature of the anesthetic plan, benefits, r

## 2020-10-27 PROCEDURE — 99233 SBSQ HOSP IP/OBS HIGH 50: CPT | Performed by: INTERNAL MEDICINE

## 2020-10-27 RX ORDER — PANTOPRAZOLE SODIUM 40 MG/1
40 TABLET, DELAYED RELEASE ORAL
Qty: 90 TABLET | Refills: 0 | Status: SHIPPED | OUTPATIENT
Start: 2020-10-28 | End: 2021-03-10

## 2020-10-27 RX ORDER — ONDANSETRON 2 MG/ML
4 INJECTION INTRAMUSCULAR; INTRAVENOUS EVERY 4 HOURS PRN
Status: DISCONTINUED | OUTPATIENT
Start: 2020-10-27 | End: 2020-10-29

## 2020-10-27 RX ORDER — DEXAMETHASONE SODIUM PHOSPHATE 4 MG/ML
4 VIAL (ML) INJECTION ONCE
Status: COMPLETED | OUTPATIENT
Start: 2020-10-27 | End: 2020-10-27

## 2020-10-27 NOTE — PLAN OF CARE
PT IS AO X 4, PT IS UP SBA AND WALKING THE HALLS, PT IS VOIDING, PT ABLE TO KEEP FLUIDS DOWN NO NAUSEA, THIS NURSE MEDICATING PT FOR PAIN, PT SLEEPING ON ROOM AIR, WILL CONTINUE TO MONITOR PT FOR SAFETY    Problem: PAIN - ADULT  Goal: Verbalizes/displays a resources  Description: INTERVENTIONS:  - Identify barriers to discharge w/pt and caregiver  - Include patient/family/discharge partner in discharge planning  - Arrange for needed discharge resources and transportation as appropriate  - Identify discharge

## 2020-10-27 NOTE — PAYOR COMM NOTE
--------------  CONTINUED STAY REVIEW    Payor: Tristan SMITH EPO  Subscriber #:  [id number]  Authorization Number: [id number]    Admit date: 10/26/20  Admit time: 0979    Admitting Physician: Alla Baltazar MD  Attending Physician:  Mile Potter postop due to allergy profile, new nausea is a concern for his PO intake, otherwise doing ok  - cont liquids  - importance of hydration discussed with pt who verbalizes understanding  - IV decadron x1, monitor liquid intake rest of day          MEDICATIONS Route User    10/27/2020 1112 Given 2 mg Intravenous Raffi Lal RN      morphINE sulfate (PF) 4 MG/ML injection 4 mg     Date Action Dose Route User    10/26/2020 1323 Given 4 mg Intravenous Cristo Lindsay RN      morphINE sulfate (PF) 4 MG/ML i

## 2020-10-27 NOTE — PROGRESS NOTES
[facility]    Progress Note    Adalbertoaddison Verdin Patient Status:  Inpatient    1980 MRN [id number]   Location [facility] 4W/SW/SE Attending Nila Hwang MD   [address] Day # 1 PCP Malu Jhaveri MD       Subjective: HYDROcodone-acetaminophen **OR** HYDROcodone-acetaminophen, ondansetron HCl, influenza virus vaccine PF    Results:     Recent Labs   Lab 10/27/20  0659   RBC 5.30   HGB 15.9   HCT 46.8   MCV 88.3   MCH 30.0   MCHC 34.0   RDW 12.8   NEPRELIM 8.18*   WBC 10

## 2020-10-28 PROCEDURE — 99233 SBSQ HOSP IP/OBS HIGH 50: CPT | Performed by: INTERNAL MEDICINE

## 2020-10-28 RX ORDER — LABETALOL HYDROCHLORIDE 5 MG/ML
10 INJECTION, SOLUTION INTRAVENOUS EVERY 6 HOURS PRN
Status: DISCONTINUED | OUTPATIENT
Start: 2020-10-28 | End: 2020-10-28

## 2020-10-28 RX ORDER — HYDRALAZINE HYDROCHLORIDE 100 MG/1
10 TABLET, FILM COATED ORAL EVERY 8 HOURS SCHEDULED
Status: DISCONTINUED | OUTPATIENT
Start: 2020-10-28 | End: 2020-10-29

## 2020-10-28 RX ORDER — METOCLOPRAMIDE HYDROCHLORIDE 5 MG/ML
10 INJECTION INTRAMUSCULAR; INTRAVENOUS EVERY 6 HOURS PRN
Status: DISCONTINUED | OUTPATIENT
Start: 2020-10-28 | End: 2020-10-29

## 2020-10-28 NOTE — PROGRESS NOTES
[facility]    Progress Note    Julissa Orta Patient Status:  Inpatient    1980 MRN [id number]   Location [facility] 4W/SW/SE Attending Lorena Brady MD   Hosp Day # 2 PCP Radha Loyd MD       Subjective: sulfate **OR** morphINE sulfate, HYDROcodone-acetaminophen **OR** HYDROcodone-acetaminophen    Results:     Recent Labs   Lab 10/27/20  0659 10/28/20  0638   RBC 5.30 5.44   HGB 15.9 16.0   HCT 46.8 47.7   MCV 88.3 87.7   MCH 30.0 29.4   MCHC 34.0 33.5   R

## 2020-10-28 NOTE — PROGRESS NOTES
Denis Vega / Kaylee Garg / Etelvina Cervantes / Xiomara Nguyen / Erick Ch / Arnold Dominique - [phone]  Answering Service [phone]      Flower Sharif Patient Status:  Inpatient    1980 MRN [id number]   Location [facility] HPI  Genitourinary: negative  Musculoskeletal: negative  Neurological: negative  Psychological: negative  Endocrine: negative  Immunologic: negative  Integumentary: negative    Blood pressure (!) 151/115, pulse 96, temperature 98.5 °F (36.9 °C), temperatur

## 2020-10-28 NOTE — PLAN OF CARE
Up and about, nauseated with emesis and bloating/swollen abdomen, bowel sounds present all quads. Lap sites with dermabond dry and intact. Not tolerating PO intake today; IV continues same rate per MDs. Voiding.  Elevated BP continues and MDs informed and o appropriate and evaluate response  - Consider cultural and social influences on pain and pain management  - Manage/alleviate anxiety  - Utilize distraction and/or relaxation techniques  - Monitor for opioid side effects  - Notify MD/LIP if interventions un for coordinating discharge planning if the patient needs post-hospital services based on physician/LIP order or complex needs related to functional status, cognitive ability or social support system  Outcome: Progressing

## 2020-10-28 NOTE — PAYOR COMM NOTE
--------------  CONTINUED STAY REVIEW    Payor: Rachid Cm [facility]  Subscriber #:  [id number]  Authorization Number: [id number]    Admit date: 10/26/20  Admit time: 0494    Admitting Physician: Charly Jackson MD  Attending Physician:  Pal Mas 70 - 99 mg/dL 93    Sodium   136 - 145 mmol/L 139    Potassium   3.5 - 5.1 mmol/L 3.6    Chloride   98 - 112 mmol/L 106    CO2   21.0 - 32.0 mmol/L 27.0    Anion Gap   0 - 18 mmol/L 6    BUN   7 - 18 mg/dL 11    Creatinine   0.70 - 1.30 mg/dL 0.90    BUN 10/28/2020 0849 Given 2 mg Intravenous Amelie MANJARREZ RN      ondansetron HCl [facility]) injection 4 mg     Date Action Dose Route User    10/27/2020 1659 Given 4 mg Intravenous Courtney Dumont RN      ondansetron HCl (ZOFRAN) injection 4 mg     Date Ac

## 2020-10-28 NOTE — PROGRESS NOTES
This nurse paged MD per hospitalist order as pt vomited. Pt has been nauseous since yesterday morning and receiving zofran for nausea. Waiting on call back.

## 2020-10-28 NOTE — PLAN OF CARE
Problem: Patient Centered Care  Goal: Patient preferences are identified and integrated in the patient's plan of care  Description: Interventions:  - What would you like us to know as we care for you?   - Provide timely, complete, and accurate informatio guidelines  Outcome: Progressing     Problem: SAFETY ADULT - FALL  Goal: Free from fall injury  Description: INTERVENTIONS:  - Assess pt frequently for physical needs  - Identify cognitive and physical deficits and behaviors that affect risk of falls.   - I

## 2020-10-29 VITALS
SYSTOLIC BLOOD PRESSURE: 144 MMHG | WEIGHT: 236 LBS | OXYGEN SATURATION: 99 % | HEART RATE: 87 BPM | DIASTOLIC BLOOD PRESSURE: 105 MMHG | HEIGHT: 69.75 IN | RESPIRATION RATE: 18 BRPM | BODY MASS INDEX: 34.17 KG/M2 | TEMPERATURE: 98 F

## 2020-10-29 PROCEDURE — 99232 SBSQ HOSP IP/OBS MODERATE 35: CPT | Performed by: INTERNAL MEDICINE

## 2020-10-29 RX ORDER — CARVEDILOL 6.25 MG/1
6.25 TABLET ORAL 2 TIMES DAILY WITH MEALS
Qty: 60 TABLET | Refills: 0 | Status: SHIPPED | OUTPATIENT
Start: 2020-10-29 | End: 2020-11-11

## 2020-10-29 RX ORDER — CARVEDILOL 6.25 MG/1
6.25 TABLET ORAL 2 TIMES DAILY WITH MEALS
Status: DISCONTINUED | OUTPATIENT
Start: 2020-10-29 | End: 2020-10-29

## 2020-10-29 NOTE — PLAN OF CARE
TOLERATING PO INTAKE AND TOLERATING AMBULATION WITHOUT NAUSEA. PROGRESS ADEQUATE FOR DISCHARGE PER MDS. REQUESTS TO GO HOME SCRIPTS AND DISCHARGE INSTRUCTIONS GIVEN AT DISCHARGE.

## 2020-10-29 NOTE — DISCHARGE SUMMARY
[facility]    Discharge Summary    Alanna Arceo Patient Status:  Inpatient    1980 MRN [id number]   Location [facility] 4W/SW/SE Attending Elijah Page MD   Hosp Day # 3 PCP Hever Hernandez MD     Date of and alert, in no acute distress. HEENT: Normocephalic. Extraocular muscles were intact. PERRL. NECK:  Supple. Trach midline  CHEST:  Symmetrical movement on inspiration  HEART:  S1 and S2 heard. RRR   LUNGS:  Good air entry.   No crackles or wheezes HYDROcodone-acetaminophen 7.5-325 MG/15ML Soln      Take 15 mL by mouth every 4 (four) hours as needed for Pain.    Quantity: 473 mL  Refills: 0     Pantoprazole Sodium 40 MG Tbec  Commonly known as: PROTONIX      Take 1 tablet (40 mg total) by mouth ever changes to keep off the weight you lost through bariatric surgery. Changes in How You Eat  · Begin stage II, Bariatric Full Liquid Diet upon discharge from the hospital. Stay on this diet for 1 week before advancing to Stage III, Pureed Diet.  Follow thi after eating foods high in sugar; such as ice cream and milkshakes. Diarrhea sometimes happens 60-90 minutes after eating. In some cases, fainting can also occur. So avoid foods that cause this. · Take vitamin supplements as directed by your doctor.     Ac to your doctor. · Learn to take your own pulse. Keep a record of your results. Ask your doctor which readings mean that you need medical attention. · Ask your doctor when you can start driving again. Don't drive if you are taking pain medication.     When

## 2020-10-29 NOTE — PLAN OF CARE
Up ambulating in [place], tolerating PO sips clears intake, no emesis this am. Pain being managed with PO medication, ice packs and repositioning and rest. DC plan is home today if tolerates po intake and ambulates.       Problem: Patient Centered Care  Goal: side effects  - Notify MD/LIP if interventions unsuccessful or patient reports new pain  - Anticipate increased pain with activity and pre-medicate as appropriate  Outcome: Progressing     Problem: RISK FOR INFECTION - ADULT  Goal: Absence of fever/infecti system  Outcome: Progressing     Problem: GASTROINTESTINAL - ADULT  Goal: Minimal or absence of nausea and vomiting  Description: INTERVENTIONS:  - Maintain adequate hydration with IV or PO as ordered and tolerated  - Nasogastric tube to low intermittent s

## 2020-10-29 NOTE — PLAN OF CARE
Problem: Patient Centered Care  Goal: Patient preferences are identified and integrated in the patient's plan of care  Description: Interventions:  - What would you like us to know as we care for you?  - Provide timely, complete, and accurate information Manage/alleviate anxiety  - Utilize distraction and/or relaxation techniques  - Monitor for opioid side effects  - Notify MD/LIP if interventions unsuccessful or patient reports new pain  - Anticipate increased pain with activity and pre-medicate as approp resources and transportation as appropriate  - Identify discharge learning needs (meds, wound care, etc)  - Arrange for interpreters to assist at discharge as needed  - Consider post-discharge preferences of patient/family/discharge partner  - Complete OSMAR meals as needed  - Monitor I&O, WT and lab values  - Obtain nutritional consult as needed  - Optimize oral hygiene and moisture  - Encourage food from home; allow for food preferences  - Enhance eating environment  Outcome: Progressing  Goal: Achieves appr

## 2020-10-29 NOTE — PROGRESS NOTES
[address] [facility] 4W/SW/SE  [address] [facility]  Dept: [phone]  Loc: [phone]    10/29/2020      [facility] / Jermain Joshua / Sary Woo / Stacy White / Kaylie Chan / Shawnee Akbar sulfate (PF) 4 MG/ML injection 4 mg, 4 mg, Intravenous, Q2H PRN    •  HYDROcodone-acetaminophen 7.5-325 MG/15ML solution 10 mL, 10 mL, Oral, Q4H PRN    Or    •  HYDROcodone-acetaminophen 7.5-325 MG/15ML solution 20 mL, 20 mL, Oral, Q4H PRN    •  Heparin So

## 2020-10-30 ENCOUNTER — TELEPHONE (OUTPATIENT)
Dept: FAMILY MEDICINE CLINIC | Facility: CLINIC | Age: 40
End: 2020-10-30

## 2020-10-30 ENCOUNTER — PATIENT OUTREACH (OUTPATIENT)
Dept: CASE MANAGEMENT | Age: 40
End: 2020-10-30

## 2020-10-30 DIAGNOSIS — Z02.9 ENCOUNTERS FOR UNSPECIFIED ADMINISTRATIVE PURPOSE: ICD-10-CM

## 2020-10-30 NOTE — TELEPHONE ENCOUNTER
Left message for [facility] nurse to call back for requested information.

## 2020-10-30 NOTE — PAYOR COMM NOTE
--------------  DISCHARGE REVIEW    Payor: Reford Boeck EMP [facility]  Subscriber #:  [id number]  Authorization Number: [id number]    Admit date: 10/26/20  Admit time:  0732  Discharge Date: 10/29/2020  4:15 PM     Admitting Physician: Lb Moon MD  Attend further nausea and tolerating diet.  Denied CP, SOB, abd pain, N/V, F/C      Review of Systems:   Other 10 point ROS was asked and was negative    Physical Exam:     10/29/20  0337 10/29/20  0836 10/29/20  1155 10/29/20  1243   BP: 137/88 (!) 155/101 (!) 14 5. 44 5.59   HGB 15.9 16.0 16.2   HCT 46.8 47.7 48.4   MCV 88.3 87.7 86.6   MCH 30.0 29.4 29.0   MCHC 34.0 33.5 33.5   RDW 12.8 12.8 12.7   NEPRELIM 8.18*  --   --    WBC 10.7 15.6* 12.2*   .0 289.0 278.0     Recent Labs   Lab 10/27/20  0659 10/28/20 this procedure, a doctor surgically changed your stomach (and maybe small intestine as well). It can only hold a small amount of food at one time, absorb a certain amount of food at one on time, or both, depending on the type of operation you had.  This jennifer too fast may cause pain, nausea and vomiting. · Remember \"slowly, small, moist and easy\". Take small bites of food, and for a visual aid, use a saucer in place of a plate to help with portion control. · Pay attention to taste. Learn how to savor food. pat the incision dry with a towel. · Follow the doctor's instructions about caring for the dressing covering your incision. · If your doctor used Steri-Strips (small white adhesive strips) to close the incision, do not remove them.  Let the strips fall o

## 2020-10-30 NOTE — TELEPHONE ENCOUNTER
Jaden Alvarez called from [facility] looking to verify pts dx of diabetes. She is looking for information regarding when diabetes was diagnosed, A1C levels, microalbuminuria, and eye exam due to pt taking diabetes medication.

## 2020-11-02 ENCOUNTER — TELEPHONE (OUTPATIENT)
Dept: SURGERY | Facility: CLINIC | Age: 40
End: 2020-11-02

## 2020-11-02 ENCOUNTER — TELEPHONE (OUTPATIENT)
Dept: FAMILY MEDICINE CLINIC | Facility: CLINIC | Age: 40
End: 2020-11-02

## 2020-11-02 NOTE — PROGRESS NOTES
Attempted to reach the patient to complete [facility]-Hospital FU call. Left a message for the pt to call NCM back at, [phone]. A TE was sent to the PCP's office for an appointment review.

## 2020-11-02 NOTE — TELEPHONE ENCOUNTER
Attempted to reach the pt for TCM and follow up on the recent hospitalization/sleeve gastrectomy procedure.   The patient does have a HFU appt scheduled for 11/13/2020, however, a  TCM/HFU appt is recommended by 11/12/2020 as pt is a moderate risk for readm

## 2020-11-02 NOTE — TELEPHONE ENCOUNTER
If he can get in on Wed 11/11 that would be better, I have a spot saved for post-hosp pt's, o/w OK to keep existing appt

## 2020-11-04 ENCOUNTER — OFFICE VISIT (OUTPATIENT)
Dept: SURGERY | Facility: CLINIC | Age: 40
End: 2020-11-04
Payer: COMMERCIAL

## 2020-11-04 ENCOUNTER — TELEPHONE (OUTPATIENT)
Dept: SURGERY | Facility: CLINIC | Age: 40
End: 2020-11-04

## 2020-11-04 ENCOUNTER — DOCUMENTATION ONLY (OUTPATIENT)
Dept: SURGERY | Facility: CLINIC | Age: 40
End: 2020-11-04

## 2020-11-04 VITALS
DIASTOLIC BLOOD PRESSURE: 86 MMHG | HEIGHT: 69 IN | SYSTOLIC BLOOD PRESSURE: 124 MMHG | OXYGEN SATURATION: 97 % | WEIGHT: 223.69 LBS | BODY MASS INDEX: 33.13 KG/M2 | HEART RATE: 79 BPM

## 2020-11-04 DIAGNOSIS — E66.01 MORBID (SEVERE) OBESITY DUE TO EXCESS CALORIES (HCC): ICD-10-CM

## 2020-11-04 DIAGNOSIS — R73.03 PREDIABETES: ICD-10-CM

## 2020-11-04 DIAGNOSIS — Z98.84 S/P LAPAROSCOPIC SLEEVE GASTRECTOMY: Primary | ICD-10-CM

## 2020-11-04 NOTE — PROGRESS NOTES
[address] [facility], [address] [facility]  [address] [facility] [address]  Dept: [phone]    11/4/2020   Bariatric Patient Follow-up Evaluation    Chief Complaint:  morbid obesity, preop 239, 10/26/2 Outpatient Medications:   •  UNABLE TO FIND, BARIATRIC VITAMINS, Disp: , Rfl:   •  carvedilol 6.25 MG Oral Tab, Take 1 tablet (6.25 mg total) by mouth 2 (two) times daily with meals.  Make sure to crush tablets, Disp: 60 tablet, Rfl: 0  •  Pantoprazole Sodi chronic morbid obesity, hyperlipidemia, prediabetes, gout, GERD, elevated blood pressure, back arthritis, and snoring who would benefit from significant and sustained weight loss, now s/p sleeve and doing well    Plan:      Cont diet advancements per post-

## 2020-11-04 NOTE — PROGRESS NOTES
Provided/reviewed bariatric post-op orientation and HELP card; stressed importance of fluids, vits, post-op f/u; meds to take/avoid, pt stts allergic to NSAIDS; activities allowed/avoid; signs and symptoms of concern; contact info; instructed pt to keep ca

## 2020-11-09 ENCOUNTER — TELEPHONE (OUTPATIENT)
Dept: FAMILY MEDICINE CLINIC | Facility: CLINIC | Age: 40
End: 2020-11-09

## 2020-11-09 NOTE — TELEPHONE ENCOUNTER
bcbs requested date when pt was diagnosed with diabetiese, and A1C, microalbumienurea test  and results.

## 2020-11-11 ENCOUNTER — OFFICE VISIT (OUTPATIENT)
Dept: FAMILY MEDICINE CLINIC | Facility: CLINIC | Age: 40
End: 2020-11-11
Payer: COMMERCIAL

## 2020-11-11 VITALS
HEIGHT: 69 IN | WEIGHT: 222.19 LBS | OXYGEN SATURATION: 96 % | SYSTOLIC BLOOD PRESSURE: 124 MMHG | BODY MASS INDEX: 32.91 KG/M2 | HEART RATE: 83 BPM | DIASTOLIC BLOOD PRESSURE: 76 MMHG

## 2020-11-11 DIAGNOSIS — Z09 HOSPITAL DISCHARGE FOLLOW-UP: ICD-10-CM

## 2020-11-11 DIAGNOSIS — D72.828 OTHER ELEVATED WHITE BLOOD CELL (WBC) COUNT: ICD-10-CM

## 2020-11-11 DIAGNOSIS — E66.9 CLASS 2 OBESITY WITHOUT SERIOUS COMORBIDITY WITH BODY MASS INDEX (BMI) OF 36.0 TO 36.9 IN ADULT, UNSPECIFIED OBESITY TYPE: ICD-10-CM

## 2020-11-11 DIAGNOSIS — Z98.84 S/P LAPAROSCOPIC SLEEVE GASTRECTOMY: ICD-10-CM

## 2020-11-11 DIAGNOSIS — R03.0 ELEVATED BLOOD PRESSURE READING WITHOUT DIAGNOSIS OF HYPERTENSION: Primary | ICD-10-CM

## 2020-11-11 DIAGNOSIS — R73.03 PREDIABETES: ICD-10-CM

## 2020-11-11 PROCEDURE — 99214 OFFICE O/P EST MOD 30 MIN: CPT | Performed by: FAMILY MEDICINE

## 2020-11-11 PROCEDURE — 3074F SYST BP LT 130 MM HG: CPT | Performed by: FAMILY MEDICINE

## 2020-11-11 PROCEDURE — 99072 ADDL SUPL MATRL&STAF TM PHE: CPT | Performed by: FAMILY MEDICINE

## 2020-11-11 PROCEDURE — 3078F DIAST BP <80 MM HG: CPT | Performed by: FAMILY MEDICINE

## 2020-11-11 PROCEDURE — 3008F BODY MASS INDEX DOCD: CPT | Performed by: FAMILY MEDICINE

## 2020-11-11 RX ORDER — CARVEDILOL 6.25 MG/1
6.25 TABLET ORAL DAILY
Qty: 30 TABLET | Refills: 0 | COMMUNITY
Start: 2020-11-11

## 2020-11-12 NOTE — PROGRESS NOTES
Multiple attempts made to reach the patient for hospital follow up, with no response or return call. Patient completed HFU on 11-11-20 with PCP. NCM closing encounter.

## 2020-11-22 ENCOUNTER — LAB ENCOUNTER (OUTPATIENT)
Dept: LAB | Facility: REFERENCE LAB | Age: 40
End: 2020-11-22
Attending: FAMILY MEDICINE
Payer: COMMERCIAL

## 2020-11-22 DIAGNOSIS — D72.828 OTHER ELEVATED WHITE BLOOD CELL (WBC) COUNT: ICD-10-CM

## 2020-11-22 PROCEDURE — 36415 COLL VENOUS BLD VENIPUNCTURE: CPT

## 2020-11-22 PROCEDURE — 85025 COMPLETE CBC W/AUTO DIFF WBC: CPT

## 2020-11-23 NOTE — PROGRESS NOTES
Juanjose Alonso,    Below are the results of your recently performed labs/tests:     All results are within NORMAL limits:    Your CBC including WBC, white blood cells, are normal.    Take care,     Jaspreet Spears MD  11/22/2020    (Report(s) are

## 2020-12-09 ENCOUNTER — OFFICE VISIT (OUTPATIENT)
Dept: SURGERY | Facility: CLINIC | Age: 40
End: 2020-12-09
Payer: COMMERCIAL

## 2020-12-09 VITALS
OXYGEN SATURATION: 97 % | SYSTOLIC BLOOD PRESSURE: 108 MMHG | BODY MASS INDEX: 32.08 KG/M2 | DIASTOLIC BLOOD PRESSURE: 69 MMHG | HEART RATE: 70 BPM | HEIGHT: 69 IN | WEIGHT: 216.63 LBS

## 2020-12-09 DIAGNOSIS — Z98.84 S/P LAPAROSCOPIC SLEEVE GASTRECTOMY: Primary | ICD-10-CM

## 2020-12-09 NOTE — PROGRESS NOTES
[address] [facility], [address]  [address] [facility] [address]  Dept: [phone]    12/9/2020   Bariatric Patient Follow-up Evaluation    Chief Complaint:  morbid obesity, preop 239, 10/26/2 •  UNABLE TO FIND, BARIATRIC VITAMINS, Disp: , Rfl:   •  Pantoprazole Sodium 40 MG Oral Tab EC, Take 1 tablet (40 mg total) by mouth every morning before breakfast., Disp: 90 tablet, Rfl: 0  •  carvedilol 6.25 MG Oral Tab, Take 1 tablet (6.25 mg total) b months  Exercise - may increase activity, no restrictions now  No longer has to crush pills or use chewable tablets  Follow-up as scheduled for 3 month post-op visit    Jonny Daniel, 12/09/20, 11:53 AM      Jonny Daniel, 12/09/20, 11:51 AM

## 2020-12-10 ENCOUNTER — OFFICE VISIT (OUTPATIENT)
Dept: SURGERY | Facility: CLINIC | Age: 40
End: 2020-12-10
Payer: COMMERCIAL

## 2020-12-10 VITALS — HEIGHT: 69 IN | WEIGHT: 217.81 LBS | BODY MASS INDEX: 32.26 KG/M2

## 2020-12-10 DIAGNOSIS — E66.09 CLASS 1 OBESITY DUE TO EXCESS CALORIES WITHOUT SERIOUS COMORBIDITY WITH BODY MASS INDEX (BMI) OF 32.0 TO 32.9 IN ADULT: Primary | ICD-10-CM

## 2020-12-10 PROCEDURE — 3008F BODY MASS INDEX DOCD: CPT | Performed by: DIETITIAN, REGISTERED

## 2020-12-10 PROCEDURE — 97803 MED NUTRITION INDIV SUBSEQ: CPT | Performed by: DIETITIAN, REGISTERED

## 2020-12-10 NOTE — PROGRESS NOTES
[address] [facility]  [address] [place] [address]  Dept: [phone]  Loc: [phone]    12/10/20      Bariatric Follow-up Nutrition Session    Swathi Saucedo is a 44year old male.      Assess 07/14/2020    Joceline 159 (H) 07/14/2020        Vitamins/Minerals:  Lab Results   Component Value Date    B12 557 07/14/2020     Lab Results   Component Value Date    VITD 33.0 07/14/2020     Lab Results   Component Value Date/Time    THIAMINE 114 07/14/20 QID- nausea, needs to take with food  Protein supplements:  Ensure Active/High Protein     Activity Level: Walks daily, high step count at work, dog walks ~40 min. Gradually adding back weight training    Other:  Pt doing well 6 weeks s/p VSG w/ 28.9% EWL.

## 2021-01-26 ENCOUNTER — TELEPHONE (OUTPATIENT)
Dept: SURGERY | Facility: CLINIC | Age: 41
End: 2021-01-26

## 2021-01-26 NOTE — TELEPHONE ENCOUNTER
Appointment audit/chart review phone call per protocol.       Per records, patient has appointments as follows:  Surgeon: 12/9/2020 - needs to f/u  Dietitian: 12/10/2020 - needs to f/u  Bariatrician: 8/7/2020 - needs to f/u  Requested a call back to inform

## 2021-03-10 ENCOUNTER — OFFICE VISIT (OUTPATIENT)
Dept: SURGERY | Facility: CLINIC | Age: 41
End: 2021-03-10
Payer: COMMERCIAL

## 2021-03-10 VITALS
HEART RATE: 61 BPM | HEIGHT: 69 IN | SYSTOLIC BLOOD PRESSURE: 120 MMHG | DIASTOLIC BLOOD PRESSURE: 80 MMHG | OXYGEN SATURATION: 98 % | BODY MASS INDEX: 31.87 KG/M2 | WEIGHT: 215.19 LBS

## 2021-03-10 DIAGNOSIS — R73.03 PREDIABETES: ICD-10-CM

## 2021-03-10 DIAGNOSIS — K76.0 FATTY LIVER: ICD-10-CM

## 2021-03-10 DIAGNOSIS — E78.00 HYPERCHOLESTEROLEMIA: Primary | ICD-10-CM

## 2021-03-10 DIAGNOSIS — Z98.84 S/P LAPAROSCOPIC SLEEVE GASTRECTOMY: ICD-10-CM

## 2021-03-10 DIAGNOSIS — K21.9 GASTROESOPHAGEAL REFLUX DISEASE, UNSPECIFIED WHETHER ESOPHAGITIS PRESENT: ICD-10-CM

## 2021-03-10 RX ORDER — PANTOPRAZOLE SODIUM 40 MG/1
40 TABLET, DELAYED RELEASE ORAL
Qty: 90 TABLET | Refills: 2 | Status: SHIPPED | OUTPATIENT
Start: 2021-03-10

## 2021-03-10 NOTE — PROGRESS NOTES
[address] [facility], [address]  [address] [facility] [address]  Dept: [phone]    3/10/2021   Bariatric Patient Follow-up Evaluation    Chief Complaint:  morbid obesity, preop 239, 10/26/2 Comment: socially      Drug use: No    Other Topics      Concerns:    Social Determinants of Health  Financial Resource Strain:       Difficulty of Paying Living Expenses:   Food Insecurity:       Worried About Running Out of Food in the Last Year:       GRECIA BMI 31.78 kg/m²   BMI:  Body mass index is 31.78 kg/m².   General: no acute distress, well-nourished  HENT: normocephalic, atraumatic  Lung: breathing comfortably, symmetrical expansion, clear to ausculation bilaterally, no wheezing  Heart: regular rate and

## 2021-04-07 ENCOUNTER — TELEPHONE (OUTPATIENT)
Dept: SURGERY | Facility: CLINIC | Age: 41
End: 2021-04-07

## 2021-10-20 ENCOUNTER — TELEPHONE (OUTPATIENT)
Dept: SURGERY | Facility: CLINIC | Age: 41
End: 2021-10-20

## 2021-12-17 ENCOUNTER — HOSPITAL ENCOUNTER (OUTPATIENT)
Age: 41
Discharge: HOME | End: 2021-12-17
Payer: COMMERCIAL

## 2021-12-18 ENCOUNTER — HOSPITAL ENCOUNTER (OUTPATIENT)
Age: 41
Discharge: HOME | End: 2021-12-18
Payer: COMMERCIAL

## 2021-12-18 VITALS
HEART RATE: 90 BPM | OXYGEN SATURATION: 98 % | RESPIRATION RATE: 16 BRPM | TEMPERATURE: 100 F | DIASTOLIC BLOOD PRESSURE: 87 MMHG | SYSTOLIC BLOOD PRESSURE: 140 MMHG

## 2021-12-18 DIAGNOSIS — R51.9 NONINTRACTABLE HEADACHE, UNSPECIFIED CHRONICITY PATTERN, UNSPECIFIED HEADACHE TYPE: ICD-10-CM

## 2021-12-18 DIAGNOSIS — U07.1 COVID-19: Primary | ICD-10-CM

## 2021-12-18 PROCEDURE — 96374 THER/PROPH/DIAG INJ IV PUSH: CPT | Performed by: NURSE PRACTITIONER

## 2021-12-18 PROCEDURE — 96361 HYDRATE IV INFUSION ADD-ON: CPT | Performed by: NURSE PRACTITIONER

## 2021-12-18 PROCEDURE — U0002 COVID-19 LAB TEST NON-CDC: HCPCS | Performed by: NURSE PRACTITIONER

## 2021-12-18 PROCEDURE — 96375 TX/PRO/DX INJ NEW DRUG ADDON: CPT | Performed by: NURSE PRACTITIONER

## 2021-12-18 PROCEDURE — 99214 OFFICE O/P EST MOD 30 MIN: CPT | Performed by: NURSE PRACTITIONER

## 2021-12-18 RX ORDER — SODIUM CHLORIDE 9 MG/ML
1000 INJECTION, SOLUTION INTRAVENOUS ONCE
Status: COMPLETED | OUTPATIENT
Start: 2021-12-18 | End: 2021-12-18

## 2021-12-18 RX ORDER — DIPHENHYDRAMINE HYDROCHLORIDE 50 MG/ML
25 INJECTION INTRAMUSCULAR; INTRAVENOUS ONCE
Status: COMPLETED | OUTPATIENT
Start: 2021-12-18 | End: 2021-12-18

## 2021-12-18 RX ORDER — METOCLOPRAMIDE HYDROCHLORIDE 5 MG/ML
10 INJECTION INTRAMUSCULAR; INTRAVENOUS ONCE
Status: COMPLETED | OUTPATIENT
Start: 2021-12-18 | End: 2021-12-18

## 2021-12-18 NOTE — ED INITIAL ASSESSMENT (HPI)
Pt states that he has had a headache for 5 days which is the worst headache of his life and has pressure behind his eyes. Denies any change in the pain with Tylenol.

## 2021-12-18 NOTE — ED PROVIDER NOTES
Patient Seen in: [facility]    History   CC: headache  HPI: Maicol Jamaica 39year old male  who presents c/o frontal headache, chills and some loose stool.   States the headache has been present beginning 12/11 and has been fairly constant sin mg total) by mouth every morning before breakfast.  Patient not taking: Reported on 12/18/2021   carvedilol 6.25 MG Oral Tab,  Take 1 tablet (6.25 mg total) by mouth daily.  Make sure to crush tablets  Patient not taking: Reported on 12/18/2021   UNABLE TO Patient given IV fluids and Benadryl/Reglan intravenously for his headache. Along with 1 L of normal saline, patient states he feels significant improvement in his symptoms. Denies headache at this time. Neuro intact.   Offered antibody infusion which pa

## 2021-12-19 ENCOUNTER — HOSPITAL ENCOUNTER (EMERGENCY)
Facility: HOSPITAL | Age: 41
Discharge: HOME | End: 2021-12-19
Payer: COMMERCIAL

## 2021-12-19 VITALS
DIASTOLIC BLOOD PRESSURE: 87 MMHG | SYSTOLIC BLOOD PRESSURE: 145 MMHG | HEART RATE: 99 BPM | OXYGEN SATURATION: 97 % | TEMPERATURE: 99 F | BODY MASS INDEX: 30.06 KG/M2 | WEIGHT: 210 LBS | RESPIRATION RATE: 18 BRPM | HEIGHT: 70 IN

## 2021-12-19 DIAGNOSIS — U07.1 COVID-19: Primary | ICD-10-CM

## 2021-12-19 PROCEDURE — 96374 THER/PROPH/DIAG INJ IV PUSH: CPT

## 2021-12-19 PROCEDURE — 83735 ASSAY OF MAGNESIUM: CPT | Performed by: NURSE PRACTITIONER

## 2021-12-19 PROCEDURE — 99284 EMERGENCY DEPT VISIT MOD MDM: CPT

## 2021-12-19 PROCEDURE — 96361 HYDRATE IV INFUSION ADD-ON: CPT

## 2021-12-19 PROCEDURE — 85025 COMPLETE CBC W/AUTO DIFF WBC: CPT | Performed by: NURSE PRACTITIONER

## 2021-12-19 PROCEDURE — 96372 THER/PROPH/DIAG INJ SC/IM: CPT

## 2021-12-19 PROCEDURE — 96375 TX/PRO/DX INJ NEW DRUG ADDON: CPT

## 2021-12-19 PROCEDURE — 80048 BASIC METABOLIC PNL TOTAL CA: CPT | Performed by: NURSE PRACTITIONER

## 2021-12-19 RX ORDER — ACETAMINOPHEN 500 MG
1000 TABLET ORAL ONCE
Status: COMPLETED | OUTPATIENT
Start: 2021-12-19 | End: 2021-12-19

## 2021-12-19 RX ORDER — DICYCLOMINE HYDROCHLORIDE 10 MG/ML
20 INJECTION INTRAMUSCULAR ONCE
Status: COMPLETED | OUTPATIENT
Start: 2021-12-19 | End: 2021-12-19

## 2021-12-19 RX ORDER — DICYCLOMINE HCL 20 MG
20 TABLET ORAL EVERY 6 HOURS PRN
Qty: 16 TABLET | Refills: 0 | Status: SHIPPED | OUTPATIENT
Start: 2021-12-19 | End: 2021-12-23

## 2021-12-19 RX ORDER — METOCLOPRAMIDE HYDROCHLORIDE 5 MG/ML
10 INJECTION INTRAMUSCULAR; INTRAVENOUS ONCE
Status: COMPLETED | OUTPATIENT
Start: 2021-12-19 | End: 2021-12-19

## 2021-12-19 RX ORDER — DIPHENHYDRAMINE HYDROCHLORIDE 50 MG/ML
25 INJECTION INTRAMUSCULAR; INTRAVENOUS ONCE
Status: COMPLETED | OUTPATIENT
Start: 2021-12-19 | End: 2021-12-19

## 2021-12-19 NOTE — ED INITIAL ASSESSMENT (HPI)
Fully vaccinated for covid. covid + with symptoms onset Tuesday. Fevers intermittent at home. Afebrile upon arrival to er. C/o cough, body aches, headache, and general malaise.

## 2021-12-19 NOTE — ED PROVIDER NOTES
Patient Seen in: [facility] Emergency Department      History   Patient presents with:  Covid    Stated Complaint: Covid    Subjective:   HPI    26-year-old male presents the emergency department for evaluation.   Patient states he has had symptoms 12/19/21 1618 (!) 134/95   Pulse 12/19/21 1618 106   Resp 12/19/21 1618 20   Temp 12/19/21 1618 98.6 °F (37 °C)   Temp src 12/19/21 1618 Temporal   SpO2 12/19/21 1618 97 %   O2 Device 12/19/21 1740 None (Room air)       Current:/87   Pulse 99   Temp (*)     All other components within normal limits   CBC W/ DIFFERENTIAL - Abnormal; Notable for the following components:    .0 (*)     Neutrophil Absolute Prelim 7.95 (*)     Neutrophil Absolute 7.95 (*)     Lymphocyte Absolute 0.97 (*)     All oth medications    dicyclomine 20 MG Oral Tab  Take 1 tablet (20 mg total) by mouth every 6 (six) hours as needed.   Qty: 16 tablet Refills: 0

## 2021-12-22 ENCOUNTER — TELEMEDICINE (OUTPATIENT)
Dept: FAMILY MEDICINE CLINIC | Facility: CLINIC | Age: 41
End: 2021-12-22
Payer: COMMERCIAL

## 2021-12-22 DIAGNOSIS — R51.9 ACUTE NONINTRACTABLE HEADACHE, UNSPECIFIED HEADACHE TYPE: ICD-10-CM

## 2021-12-22 DIAGNOSIS — R52 BODY ACHES: ICD-10-CM

## 2021-12-22 DIAGNOSIS — U07.1 COVID: Primary | ICD-10-CM

## 2021-12-22 DIAGNOSIS — R19.7 DIARRHEA, UNSPECIFIED TYPE: ICD-10-CM

## 2021-12-22 PROCEDURE — 99213 OFFICE O/P EST LOW 20 MIN: CPT | Performed by: NURSE PRACTITIONER

## 2021-12-22 NOTE — PROGRESS NOTES
Due to the real risk of possible exposure to Coronavirus (CoV-2, COVID-19) in the office/medical building and recommendations for social distancing (key to mitigation/limiting the spread of the virus) a Virtual or Telemedicine visit over the phone was perf above.  Coding/billing information is submitted for this visit based on complexity of care and/or time spent for the visit. HPI:  Patient presents with:   Follow - Up: Headache, COVID+      Randalyn Tk is a 39year old male who calls for complaints of mouth daily.  Make sure to crush tablets (Patient not taking: Reported on 12/18/2021) 30 tablet 0   • UNABLE TO FIND BARIATRIC VITAMINS (Patient not taking: Reported on 12/18/2021)       Past Medical History:   Diagnosis Date   • Calculus of kidney    • Deg Imaging & Consults:  None    Petey Blazing, APRN     Time spent during encounter: 15 minutes

## 2021-12-22 NOTE — PATIENT INSTRUCTIONS
Coronavirus Disease 2019 (COVID-19)     [facility] is committed to the safety and well-being of our patients, members, employees, and communities.  As concerns arise about the new strain of coronavirus that causes COVID-19, [facility] exposure  • After day 7 from date of last exposure with a negative test result (test must occur on day 5 or later)  After stopping quarantine, you should  • Watch for symptoms until 14 days after exposure.   • If you have symptoms, immediately self-isolate Care     If you are awaiting test results or are confirmed positive for COVID -19, and your symptoms worsen at home with symptoms such as: extreme weakness, difficult breathing, or unrelenting fevers greater than 100.4 degrees Fahrenheit, you should contac Follow-up  If you are diagnosed with COVID, refrain from exercise until approved by your primary care provider. Please call your primary care provider within 2 days of your discharge to arrange for a telehealth follow-up.  CDC does not recommend repeat test Control & Prevention (CDC)  10 things you can do to manage your health at home, [employer].[email or web address]. pdf  [email or web address] Retrieved March 17, 2021, from [email or web address]/covid-19-information/covid-19-long-haulers. html  Long-term effects of covid-19. (n.d.).  Retrieved May 11, 2021, from [employer] [place] (oxygen saturation, or O2 sat) may be checked often. This is to make sure your lungs are getting enough oxygen into your body. Your O2 sat level may be checked after each time you change position.   Getting ready for proning at home  Before you do prone pos needed. · If you have neck problems, you can fold a towel into a horseshoe shape to support your face. This will let you lie face down without turning your head to the side. · Try putting your arms in different positions to see what is most comfortable.

## 2022-03-06 ENCOUNTER — HOSPITAL ENCOUNTER (OUTPATIENT)
Age: 42
Discharge: HOME | End: 2022-03-06
Payer: COMMERCIAL

## 2022-03-06 ENCOUNTER — APPOINTMENT (OUTPATIENT)
Dept: GENERAL RADIOLOGY | Age: 42
End: 2022-03-06
Attending: NURSE PRACTITIONER
Payer: COMMERCIAL

## 2022-03-06 VITALS
DIASTOLIC BLOOD PRESSURE: 99 MMHG | WEIGHT: 210 LBS | BODY MASS INDEX: 30.06 KG/M2 | OXYGEN SATURATION: 99 % | HEIGHT: 70 IN | SYSTOLIC BLOOD PRESSURE: 148 MMHG | TEMPERATURE: 98 F | HEART RATE: 78 BPM | RESPIRATION RATE: 18 BRPM

## 2022-03-06 DIAGNOSIS — S39.012A LUMBAR STRAIN, INITIAL ENCOUNTER: Primary | ICD-10-CM

## 2022-03-06 LAB
CLARITY UR: CLEAR
GLUCOSE UR STRIP-MCNC: NEGATIVE MG/DL
HGB UR QL STRIP: NEGATIVE
LEUKOCYTE ESTERASE UR QL STRIP: NEGATIVE
NITRITE UR QL STRIP: NEGATIVE
PH UR STRIP: 6 [PH]
PROT UR STRIP-MCNC: NEGATIVE MG/DL
SP GR UR STRIP: >=1.03
UROBILINOGEN UR STRIP-ACNC: <2 MG/DL

## 2022-03-06 PROCEDURE — 74018 RADEX ABDOMEN 1 VIEW: CPT | Performed by: NURSE PRACTITIONER

## 2022-03-06 PROCEDURE — 99213 OFFICE O/P EST LOW 20 MIN: CPT | Performed by: NURSE PRACTITIONER

## 2022-03-06 PROCEDURE — 81002 URINALYSIS NONAUTO W/O SCOPE: CPT | Performed by: NURSE PRACTITIONER

## 2022-03-06 RX ORDER — METHYLPREDNISOLONE 4 MG/1
TABLET ORAL
Qty: 1 EACH | Refills: 0 | Status: SHIPPED | OUTPATIENT
Start: 2022-03-06

## 2022-03-06 RX ORDER — CYCLOBENZAPRINE HCL 10 MG
10 TABLET ORAL 3 TIMES DAILY PRN
Qty: 20 TABLET | Refills: 0 | Status: SHIPPED | OUTPATIENT
Start: 2022-03-06 | End: 2022-03-13

## 2022-03-06 RX ORDER — TRAMADOL HYDROCHLORIDE 50 MG/1
TABLET ORAL EVERY 6 HOURS PRN
Qty: 10 TABLET | Refills: 0 | Status: SHIPPED | OUTPATIENT
Start: 2022-03-06 | End: 2022-03-11

## 2022-03-06 NOTE — ED INITIAL ASSESSMENT (HPI)
Patient with right flank pain for 10 days. Patient reports pain started to radiate Thursday to abdomen. +nausea without emesis.  Patient with a hx of kidney stones  No fever, No rash, No hematuria

## 2022-04-04 ENCOUNTER — PATIENT MESSAGE (OUTPATIENT)
Dept: FAMILY MEDICINE CLINIC | Facility: CLINIC | Age: 42
End: 2022-04-04

## 2022-04-05 NOTE — TELEPHONE ENCOUNTER
From: Stephanie Ambrose  To: Hodan Dove MD  Sent: 4/4/2022 4:46 PM CDT  Subject: Refill    I was in urgent care about a month ago for back pain which came down to pulled muscle and was doing good after finishing my prescriptions. I pulled it again or I must have not let it heel all the way. Is there any way I can get refills.      Thank you   Lon Soot

## 2022-10-27 NOTE — ED NOTES
Patient diaphoretic, pain in left flank and back region. Patient actively vomiting and in apparent discomfort/pain. - Predominantly photopenic pelvic mass with mild, peripheral FDG avidity is indeterminate. Contrast-enhanced CT or MRI is recommended for further evaluation.  - Pelvic US on 10/26 suboptimal study   - F/U Pelvic MRI

## 2022-11-15 PROBLEM — R73.01 IFG (IMPAIRED FASTING GLUCOSE): Status: ACTIVE | Noted: 2020-07-20

## 2022-11-17 ENCOUNTER — OFFICE VISIT (OUTPATIENT)
Dept: FAMILY MEDICINE CLINIC | Facility: CLINIC | Age: 42
End: 2022-11-17
Payer: COMMERCIAL

## 2022-11-17 VITALS
BODY MASS INDEX: 32.64 KG/M2 | WEIGHT: 228 LBS | HEART RATE: 105 BPM | OXYGEN SATURATION: 99 % | SYSTOLIC BLOOD PRESSURE: 118 MMHG | HEIGHT: 70 IN | DIASTOLIC BLOOD PRESSURE: 78 MMHG

## 2022-11-17 DIAGNOSIS — Z28.21 INFLUENZA VACCINATION DECLINED BY PATIENT: ICD-10-CM

## 2022-11-17 DIAGNOSIS — Z00.00 ADULT GENERAL MEDICAL EXAMINATION: ICD-10-CM

## 2022-11-17 DIAGNOSIS — N48.89 PENILE LUMP: Primary | ICD-10-CM

## 2022-11-17 PROCEDURE — 99213 OFFICE O/P EST LOW 20 MIN: CPT | Performed by: NURSE PRACTITIONER

## 2022-11-17 PROCEDURE — 3078F DIAST BP <80 MM HG: CPT | Performed by: NURSE PRACTITIONER

## 2022-11-17 PROCEDURE — 3074F SYST BP LT 130 MM HG: CPT | Performed by: NURSE PRACTITIONER

## 2022-11-17 PROCEDURE — 3008F BODY MASS INDEX DOCD: CPT | Performed by: NURSE PRACTITIONER

## 2022-11-17 RX ORDER — PHENTERMINE HYDROCHLORIDE 37.5 MG/1
TABLET ORAL
COMMUNITY
Start: 2022-06-01

## 2022-11-22 ENCOUNTER — HOSPITAL ENCOUNTER (OUTPATIENT)
Dept: ULTRASOUND IMAGING | Facility: HOSPITAL | Age: 42
Discharge: HOME | End: 2022-11-22
Attending: NURSE PRACTITIONER
Payer: COMMERCIAL

## 2022-11-22 DIAGNOSIS — N48.89 PENILE LUMP: ICD-10-CM

## 2022-11-22 PROCEDURE — 76882 US LMTD JT/FCL EVL NVASC XTR: CPT | Performed by: NURSE PRACTITIONER

## 2023-01-24 ENCOUNTER — TELEPHONE (OUTPATIENT)
Dept: SURGERY | Facility: CLINIC | Age: 43
End: 2023-01-24

## 2023-11-03 ENCOUNTER — OFFICE VISIT (OUTPATIENT)
Dept: FAMILY MEDICINE CLINIC | Facility: CLINIC | Age: 43
End: 2023-11-03
Payer: COMMERCIAL

## 2023-11-03 VITALS
OXYGEN SATURATION: 97 % | DIASTOLIC BLOOD PRESSURE: 74 MMHG | SYSTOLIC BLOOD PRESSURE: 128 MMHG | BODY MASS INDEX: 31.9 KG/M2 | HEART RATE: 80 BPM | HEIGHT: 70 IN | TEMPERATURE: 98 F | WEIGHT: 222.81 LBS

## 2023-11-03 DIAGNOSIS — S29.012A MUSCLE STRAIN OF LEFT UPPER BACK, INITIAL ENCOUNTER: ICD-10-CM

## 2023-11-03 DIAGNOSIS — M54.6 ACUTE LEFT-SIDED THORACIC BACK PAIN: ICD-10-CM

## 2023-11-03 PROCEDURE — 3078F DIAST BP <80 MM HG: CPT | Performed by: STUDENT IN AN ORGANIZED HEALTH CARE EDUCATION/TRAINING PROGRAM

## 2023-11-03 PROCEDURE — 3074F SYST BP LT 130 MM HG: CPT | Performed by: STUDENT IN AN ORGANIZED HEALTH CARE EDUCATION/TRAINING PROGRAM

## 2023-11-03 PROCEDURE — 3008F BODY MASS INDEX DOCD: CPT | Performed by: STUDENT IN AN ORGANIZED HEALTH CARE EDUCATION/TRAINING PROGRAM

## 2023-11-03 PROCEDURE — 99213 OFFICE O/P EST LOW 20 MIN: CPT | Performed by: STUDENT IN AN ORGANIZED HEALTH CARE EDUCATION/TRAINING PROGRAM

## 2023-11-03 RX ORDER — CYCLOBENZAPRINE HCL 10 MG
10 TABLET ORAL NIGHTLY
Qty: 14 TABLET | Refills: 0 | Status: SHIPPED | OUTPATIENT
Start: 2023-11-03

## 2023-11-03 RX ORDER — METHYLPREDNISOLONE 4 MG/1
TABLET ORAL
Qty: 21 EACH | Refills: 0 | Status: SHIPPED | OUTPATIENT
Start: 2023-11-03

## 2023-11-13 ENCOUNTER — NURSE TRIAGE (OUTPATIENT)
Dept: FAMILY MEDICINE CLINIC | Facility: CLINIC | Age: 43
End: 2023-11-13

## 2023-11-13 DIAGNOSIS — M54.6 ACUTE LEFT-SIDED THORACIC BACK PAIN: ICD-10-CM

## 2023-11-13 DIAGNOSIS — S29.012A MUSCLE STRAIN OF LEFT UPPER BACK, INITIAL ENCOUNTER: ICD-10-CM

## 2023-11-13 RX ORDER — METHYLPREDNISOLONE 4 MG/1
TABLET ORAL
Qty: 21 EACH | Refills: 0 | Status: CANCELLED | OUTPATIENT
Start: 2023-11-13

## 2023-11-17 NOTE — TELEPHONE ENCOUNTER
Patient requesting refill for medrol dose pack- however, saw it was for acute pain? Please advise.  Thanks

## 2023-11-21 NOTE — TELEPHONE ENCOUNTER
RN s/w patient. Patient states he re aggravated back pain at work. Pt states this happened last week. Pt turned \"weird at [facility]". Pt states that it is the same pain as he had last time. Pt is trying to set up PT now. Pt says back pain is \"not as bad as last week\" but pain 6/10 currently. Please advise if we can send MEDROL pack or do you want visit tomorrow?      Thanks

## 2023-11-22 RX ORDER — METHYLPREDNISOLONE 4 MG/1
TABLET ORAL
Qty: 21 EACH | Refills: 0 | Status: SHIPPED | OUTPATIENT
Start: 2023-11-22

## 2024-01-27 NOTE — Clinical Note
Hi I placed referral for dietician. Please check RD and bariatric surgery benefits. Wants to have surgery.  Thank [facility] Lawson
Robin,I saw Juan Francisco Golden in clinic today for weight loss/management. I have recommended intensive lifestyle/behavioral modifications for weight loss.  In addition, he greatly desires bariatric surgery so we will get started on this process if surgery will is cove
s/p fall *3 left leg has been giving out - patient saw his ortho doc- xray's done, patient fell yesterday and today, denies hitting head. patient on eliquiz

## 2024-02-25 ENCOUNTER — HOSPITAL ENCOUNTER (OUTPATIENT)
Age: 44
Discharge: HOME | End: 2024-02-25
Payer: COMMERCIAL

## 2024-02-25 VITALS
RESPIRATION RATE: 16 BRPM | DIASTOLIC BLOOD PRESSURE: 87 MMHG | OXYGEN SATURATION: 100 % | TEMPERATURE: 98 F | SYSTOLIC BLOOD PRESSURE: 140 MMHG | HEART RATE: 88 BPM

## 2024-02-25 DIAGNOSIS — M10.9 ACUTE GOUT OF RIGHT FOOT, UNSPECIFIED CAUSE: Primary | ICD-10-CM

## 2024-02-25 PROCEDURE — 99213 OFFICE O/P EST LOW 20 MIN: CPT | Performed by: EMERGENCY MEDICINE

## 2024-02-25 RX ORDER — COLCHICINE 0.6 MG/1
TABLET ORAL
Qty: 3 TABLET | Refills: 0 | Status: SHIPPED | OUTPATIENT
Start: 2024-02-25

## 2024-02-25 RX ORDER — CYCLOBENZAPRINE HCL 10 MG
10 TABLET ORAL 3 TIMES DAILY PRN
Qty: 10 TABLET | Refills: 0 | Status: SHIPPED | OUTPATIENT
Start: 2024-02-25 | End: 2024-02-28

## 2024-02-25 RX ORDER — PREDNISONE 20 MG/1
40 TABLET ORAL DAILY
Qty: 10 TABLET | Refills: 0 | Status: SHIPPED | OUTPATIENT
Start: 2024-02-25 | End: 2024-03-01

## 2024-02-25 NOTE — DISCHARGE INSTRUCTIONS
Colchicine 2 tablets as soon as you get the medication, then 1 hour later you can take 1/3 tablet.  This will complete the regimen for gout.  Prednisone anti-inflammatory 2 tablets every day for the next 5 days  Drink a ton of water to help cover the uric acid.   Contact your doctor tomorrow for follow-up to see when they want to see you again.

## 2024-02-25 NOTE — ED PROVIDER NOTES
Patient Seen in: [facility]      History     Chief Complaint   Patient presents with    Foot Pain     Stated Complaint: Foot Pain    Subjective:   HPI  Primitivo Thompson is a 43 year old male here for right foot pain that started Friday.  Symptoms are not getting better.  Feels similar to when he has had gout in the past.  Sensitive to touch.  No pain out of portion, no swelling out of proportion.  Immunizations up-to-date.  No wounds.  Other medical history includes but limited to DJD, acid reflux, fatty liver, and prediabetic.    Objective:   Past Medical History:   Diagnosis Date    Calculus of kidney     Degenerative joint disease of low back 4/27/2020    Fatty liver 6/25/2019    Gastroesophageal reflux disease 4/27/2020    Gout 2017    History of gout 12/3/2018    Hypercholesterolemia 6/25/2019    Prediabetes 6/25/2019    S/P laparoscopic sleeve gastrectomy 11/4/2020    Snoring 6/17/2019    Umbilical hernia without obstruction and without gangrene 6/17/2019              Past Surgical History:   Procedure Laterality Date    APPENDECTOMY      OTHER Left     Tendon repair left wrist                Social History     Socioeconomic History    Marital status:    Tobacco Use    Smoking status: Never    Smokeless tobacco: Current     Types: Chew   Vaping Use    Vaping Use: Never used   Substance and Sexual Activity    Alcohol use: Yes     Alcohol/week: 4.0 - 6.0 standard drinks of alcohol     Types: 4 - 6 Cans of beer per week     Comment: socially    Drug use: No              Review of Systems    Positive for stated complaint: Foot Pain  Other systems are as noted in HPI.  Constitutional and vital signs reviewed.      All other systems reviewed and negative except as noted above.    Physical Exam     ED Triage Vitals [02/25/24 0811]   /87   Pulse 88   Resp 16   Temp 97.8 °F (36.6 °C)   Temp src Temporal   SpO2 100 %   O2 Device None (Room air)       Current:/87   Pulse 88   Temp  97.8 °F (36.6 °C) (Temporal)   Resp 16   SpO2 100%         Physical Exam  Vitals and nursing note reviewed.   Constitutional:       Appearance: Normal appearance.   HENT:      Head: Normocephalic.   Cardiovascular:      Pulses: Normal pulses.   Musculoskeletal:         General: No signs of injury.      Comments: Skin sensitivity to right foot.  Swelling noted, and mild erythema to midfoot.  No bony tenderness.  No deformity.  Compartments soft, NVI, and pulses present.   Neurological:      General: No focal deficit present.      Mental Status: He is alert.   Psychiatric:         Mood and Affect: Mood normal.         Behavior: Behavior normal.         Thought Content: Thought content normal.         Judgment: Judgment normal.               ED Course   Labs Reviewed - No data to display                   MDM                                      Medical Decision Making  Differential diagnosis includes not limited to osteoarthritis vs gout arthritis, stress fracture, cellulitis, or somatic causes of symptoms.    We will treat for acute gout of right foot.  Prescriptions printed and given to patient for treatment regimen.  Primary care follow-up as needed, strict ER precautions discussed.  All questions answered, reassurance given, and cleared for discharge home.    Problems Addressed:  Acute gout of right foot, unspecified cause: acute illness or injury    Amount and/or Complexity of Data Reviewed  External Data Reviewed: notes.    Risk  OTC drugs.        Disposition and Plan     Clinical Impression:  1. Acute gout of right foot, unspecified cause         Disposition:  Discharge  2/25/2024  8:22 am    Follow-up:  Clayton Betancourt MD  [address] [place] [id number]  [phone]                Medications Prescribed:  Discharge Medication List as of 2/25/2024  8:26 AM        START taking these medications    Details   colchicine 0.6 MG Oral Tab Take TWO tabs initially. 60 minutes later take ONE tab. This  will complete the prescription., Print, Disp-3 tablet, R-0NPI: [id number]. Collaborating: Kiana Oro.      predniSONE 20 MG Oral Tab Take 2 tablets (40 mg total) by mouth daily for 5 days., Print, Disp-10 tablet, R-0      !! cyclobenzaprine 10 MG Oral Tab Take 1 tablet (10 mg total) by mouth 3 (three) times daily as needed for Muscle spasms., Print, Disp-10 tablet, R-0       !! - Potential duplicate medications found. Please discuss with provider.

## 2024-02-26 ENCOUNTER — PATIENT MESSAGE (OUTPATIENT)
Dept: FAMILY MEDICINE CLINIC | Facility: CLINIC | Age: 44
End: 2024-02-26

## 2024-02-26 DIAGNOSIS — Z00.00 ROUTINE MEDICAL EXAM: Primary | ICD-10-CM

## 2024-02-26 DIAGNOSIS — Z87.39 HISTORY OF GOUT: ICD-10-CM

## 2024-02-26 NOTE — TELEPHONE ENCOUNTER
From: Primitivo Thompson  To: Clayton Betancourt  Sent: 2/26/2024 9:40 AM CST  Subject: Blood work    I had a gout attack and went to immediate care yesterday. I have an appointment Friday and would like to get bloodwork done before my Fridays appointment to go over if possible. Can I get a referral for bloodwork.   Thank you   Scottie Thompson

## 2024-02-29 ENCOUNTER — LAB ENCOUNTER (OUTPATIENT)
Dept: LAB | Facility: HOSPITAL | Age: 44
End: 2024-02-29
Attending: STUDENT IN AN ORGANIZED HEALTH CARE EDUCATION/TRAINING PROGRAM
Payer: COMMERCIAL

## 2024-02-29 DIAGNOSIS — Z87.39 HISTORY OF GOUT: ICD-10-CM

## 2024-02-29 DIAGNOSIS — Z00.00 ROUTINE MEDICAL EXAM: ICD-10-CM

## 2024-02-29 LAB
ALBUMIN SERPL-MCNC: 4.7 G/DL (ref 3.2–4.8)
ALBUMIN/GLOB SERPL: 1.6 {RATIO} (ref 1–2)
ALP LIVER SERPL-CCNC: 36 U/L
ALT SERPL-CCNC: 26 U/L
ANION GAP SERPL CALC-SCNC: 8 MMOL/L (ref 0–18)
AST SERPL-CCNC: 22 U/L (ref ?–34)
BILIRUB SERPL-MCNC: 0.6 MG/DL (ref 0.3–1.2)
BUN BLD-MCNC: 21 MG/DL (ref 9–23)
BUN/CREAT SERPL: 18.4 (ref 10–20)
CALCIUM BLD-MCNC: 9.7 MG/DL (ref 8.7–10.4)
CHLORIDE SERPL-SCNC: 106 MMOL/L (ref 98–112)
CHOLEST SERPL-MCNC: 208 MG/DL (ref ?–200)
CO2 SERPL-SCNC: 26 MMOL/L (ref 21–32)
CREAT BLD-MCNC: 1.14 MG/DL
DEPRECATED RDW RBC AUTO: 39.8 FL (ref 35.1–46.3)
EGFRCR SERPLBLD CKD-EPI 2021: 82 ML/MIN/1.73M2 (ref 60–?)
ERYTHROCYTE [DISTWIDTH] IN BLOOD BY AUTOMATED COUNT: 12.2 % (ref 11–15)
EST. AVERAGE GLUCOSE BLD GHB EST-MCNC: 100 MG/DL (ref 68–126)
FASTING PATIENT LIPID ANSWER: YES
FASTING STATUS PATIENT QL REPORTED: YES
GLOBULIN PLAS-MCNC: 3 G/DL (ref 2.8–4.4)
GLUCOSE BLD-MCNC: 99 MG/DL (ref 70–99)
HBA1C MFR BLD: 5.1 % (ref ?–5.7)
HCT VFR BLD AUTO: 47.5 %
HDLC SERPL-MCNC: 65 MG/DL (ref 40–59)
HGB BLD-MCNC: 16.3 G/DL
LDLC SERPL CALC-MCNC: 126 MG/DL (ref ?–100)
MCH RBC QN AUTO: 30.5 PG (ref 26–34)
MCHC RBC AUTO-ENTMCNC: 34.3 G/DL (ref 31–37)
MCV RBC AUTO: 89 FL
NONHDLC SERPL-MCNC: 143 MG/DL (ref ?–130)
OSMOLALITY SERPL CALC.SUM OF ELEC: 293 MOSM/KG (ref 275–295)
PLATELET # BLD AUTO: 312 10(3)UL (ref 150–450)
POTASSIUM SERPL-SCNC: 4.4 MMOL/L (ref 3.5–5.1)
PROT SERPL-MCNC: 7.7 G/DL (ref 5.7–8.2)
RBC # BLD AUTO: 5.34 X10(6)UL
SODIUM SERPL-SCNC: 140 MMOL/L (ref 136–145)
TRIGL SERPL-MCNC: 98 MG/DL (ref 30–149)
TSI SER-ACNC: 0.66 MIU/ML (ref 0.55–4.78)
URATE SERPL-MCNC: 7.5 MG/DL
VLDLC SERPL CALC-MCNC: 17 MG/DL (ref 0–30)
WBC # BLD AUTO: 9.6 X10(3) UL (ref 4–11)

## 2024-02-29 PROCEDURE — 85027 COMPLETE CBC AUTOMATED: CPT

## 2024-02-29 PROCEDURE — 84443 ASSAY THYROID STIM HORMONE: CPT

## 2024-02-29 PROCEDURE — 84550 ASSAY OF BLOOD/URIC ACID: CPT

## 2024-02-29 PROCEDURE — 36415 COLL VENOUS BLD VENIPUNCTURE: CPT

## 2024-02-29 PROCEDURE — 80053 COMPREHEN METABOLIC PANEL: CPT

## 2024-02-29 PROCEDURE — 80061 LIPID PANEL: CPT

## 2024-02-29 PROCEDURE — 83036 HEMOGLOBIN GLYCOSYLATED A1C: CPT

## 2024-03-05 ENCOUNTER — OFFICE VISIT (OUTPATIENT)
Dept: FAMILY MEDICINE CLINIC | Facility: CLINIC | Age: 44
End: 2024-03-05
Payer: COMMERCIAL

## 2024-03-05 VITALS
WEIGHT: 227.81 LBS | DIASTOLIC BLOOD PRESSURE: 82 MMHG | OXYGEN SATURATION: 98 % | HEART RATE: 98 BPM | TEMPERATURE: 98 F | BODY MASS INDEX: 32.61 KG/M2 | HEIGHT: 70 IN | SYSTOLIC BLOOD PRESSURE: 128 MMHG

## 2024-03-05 DIAGNOSIS — S29.012A MUSCLE STRAIN OF LEFT UPPER BACK, INITIAL ENCOUNTER: ICD-10-CM

## 2024-03-05 DIAGNOSIS — M54.6 ACUTE LEFT-SIDED THORACIC BACK PAIN: ICD-10-CM

## 2024-03-05 DIAGNOSIS — M25.50 MULTIPLE JOINT PAIN: ICD-10-CM

## 2024-03-05 DIAGNOSIS — M10.9 ACUTE GOUT OF RIGHT FOOT, UNSPECIFIED CAUSE: ICD-10-CM

## 2024-03-05 PROCEDURE — 99214 OFFICE O/P EST MOD 30 MIN: CPT | Performed by: STUDENT IN AN ORGANIZED HEALTH CARE EDUCATION/TRAINING PROGRAM

## 2024-03-05 RX ORDER — CYCLOBENZAPRINE HCL 10 MG
10 TABLET ORAL NIGHTLY
Qty: 14 TABLET | Refills: 0 | OUTPATIENT
Start: 2024-03-05

## 2024-03-05 RX ORDER — PREDNISONE 20 MG/1
40 TABLET ORAL DAILY
Qty: 10 TABLET | Refills: 0 | Status: SHIPPED | OUTPATIENT
Start: 2024-03-05 | End: 2024-03-10

## 2024-03-05 RX ORDER — METHYLPREDNISOLONE 4 MG/1
TABLET ORAL
Qty: 21 EACH | Refills: 0 | OUTPATIENT
Start: 2024-03-05

## 2024-03-05 NOTE — PROGRESS NOTES
Subjective:   Primitivo Thompson is a 43 year old male who presents for Foot Pain (Patient presents today with right foot pain which started about 2 weeks ago. Patient stated \"possibly arthritis or gout & icing does help\". )     43-year-old male complaining of right foot pain as well as bilateral third and fourth digit DIP joint pain.  Went to the immediate care on 2/25/2024 for foot complaint, diagnosed with acute gout and prescribed colchicine, prednisone and cyclobenzaprine.  Patient states he improved very well on medication however right foot pain reoccurred 2 days ago.  3 days ago had 2 glasses of champagne.  He is hydrating well.  Also mentions recent achiness in bilateral third and fourth DIP joints L>R.  Previously referred to rheumatology in 2019, was on allopurinol with goal uric acid to be less than 6.0.  He cannot take NSAIDs due to hives and swelling of the tongue.  No trauma.    History/Other:    Chief Complaint Reviewed and Verified  Nursing Notes Reviewed and   Verified  Tobacco Reviewed  Allergies Reviewed  Medications Reviewed    Medical History Reviewed  Surgical History Reviewed  Family History   Reviewed  Social History Reviewed         Tobacco:       Current Outpatient Medications   Medication Sig Dispense Refill    predniSONE 20 MG Oral Tab Take 2 tablets (40 mg total) by mouth daily for 5 days. 10 tablet 0    colchicine 0.6 MG Oral Tab Take TWO tabs initially. 60 minutes later take ONE tab. This will complete the prescription. (Patient not taking: Reported on 3/5/2024) 3 tablet 0    methylPREDNISolone (MEDROL) 4 MG Oral Tablet Therapy Pack As directed. (Patient not taking: Reported on 3/5/2024) 21 each 0    cyclobenzaprine 10 MG Oral Tab Take 1 tablet (10 mg total) by mouth nightly. (Patient not taking: Reported on 3/5/2024) 14 tablet 0         Review of Systems:  Review of Systems   Constitutional:  Negative for chills, diaphoresis and fever.   HENT:  Negative for congestion, ear  discharge, ear pain, sinus pressure, sinus pain and sore throat.    Eyes:  Negative for pain and discharge.   Respiratory:  Negative for cough, chest tightness, shortness of breath and wheezing.    Cardiovascular:  Negative for chest pain and palpitations.   Gastrointestinal:  Negative for abdominal pain, diarrhea, nausea and vomiting.   Endocrine: Negative for cold intolerance and heat intolerance.   Genitourinary:  Negative for dysuria, flank pain, frequency and urgency.   Musculoskeletal:  Negative for joint swelling.        Right foot pain.  Bilateral finger joint pains.   Skin:  Negative for rash.   Neurological:  Negative for dizziness, syncope and headaches.   Psychiatric/Behavioral:  Negative for confusion and hallucinations.        Objective:   /82 (BP Location: Right arm, Patient Position: Sitting, Cuff Size: large)   Pulse 98   Temp 97.7 °F (36.5 °C) (Temporal)   Ht 5' 10\" (1.778 m)   Wt 227 lb 12.8 oz (103.3 kg)   SpO2 98%   BMI 32.69 kg/m²  Estimated body mass index is 32.69 kg/m² as calculated from the following:    Height as of this encounter: 5' 10\" (1.778 m).    Weight as of this encounter: 227 lb 12.8 oz (103.3 kg).  Physical Exam  Constitutional:       General: He is not in acute distress.     Appearance: Normal appearance. He is not ill-appearing or toxic-appearing.   HENT:      Head: Normocephalic and atraumatic.   Cardiovascular:      Rate and Rhythm: Normal rate and regular rhythm.      Heart sounds: Normal heart sounds. No murmur heard.     No gallop.   Pulmonary:      Effort: Pulmonary effort is normal. No respiratory distress.      Breath sounds: Normal breath sounds. No stridor. No wheezing, rhonchi or rales.   Musculoskeletal:      Cervical back: Normal range of motion and neck supple. No rigidity or tenderness.      Comments: Right foot swelling at lateral aspect with mild erythema and tenderness to palpation.  Right foot dorsalis pedis pulse 2+.  Tenderness to palpation of  bilateral third and fourth digit DIP joint.   Skin:     General: Skin is warm and dry.   Neurological:      General: No focal deficit present.      Mental Status: He is alert and oriented to person, place, and time. Mental status is at baseline.   Psychiatric:         Mood and Affect: Mood normal.         Behavior: Behavior normal.         Thought Content: Thought content normal.         Judgment: Judgment normal.         Assessment & Plan:     1. Acute gout of right foot, unspecified cause  -Avoid triggering factors  -If symptoms recur will consider restarting allopurinol  -If symptoms do not resolve with 5 days of prednisone can contact us for consideration of extension  -     Sed Laura Mcfarland (Automated); Future; Expected date: 03/05/2024  -     C-Reactive Protein; Future; Expected date: 03/05/2024  -     predniSONE; Take 2 tablets (40 mg total) by mouth daily for 5 days.  Dispense: 10 tablet; Refill: 0  2. Multiple joint pain  -     Rheumatoid Arthritis Factor; Future; Expected date: 03/05/2024  -     Cyclic Citrullinate Pep. IGG; Future; Expected date: 03/05/2024  -     Sed Laura Mcfarland (Automated); Future; Expected date: 03/05/2024  -     C-Reactive Protein; Future; Expected date: 03/05/2024        Return if symptoms worsen or fail to improve.    Clayton Betancourt MD, 3/5/2024, 3:27 PM

## 2024-03-09 ENCOUNTER — LAB ENCOUNTER (OUTPATIENT)
Dept: LAB | Facility: HOSPITAL | Age: 44
End: 2024-03-09
Attending: STUDENT IN AN ORGANIZED HEALTH CARE EDUCATION/TRAINING PROGRAM
Payer: COMMERCIAL

## 2024-03-09 DIAGNOSIS — M25.50 MULTIPLE JOINT PAIN: ICD-10-CM

## 2024-03-09 DIAGNOSIS — M10.9 ACUTE GOUT OF RIGHT FOOT, UNSPECIFIED CAUSE: ICD-10-CM

## 2024-03-09 LAB
CRP SERPL-MCNC: <0.4 MG/DL (ref ?–1)
ERYTHROCYTE [SEDIMENTATION RATE] IN BLOOD: 6 MM/HR
RHEUMATOID FACT SERPL-ACNC: <10 IU/ML (ref ?–14)

## 2024-03-09 PROCEDURE — 86200 CCP ANTIBODY: CPT

## 2024-03-09 PROCEDURE — 86140 C-REACTIVE PROTEIN: CPT

## 2024-03-09 PROCEDURE — 36415 COLL VENOUS BLD VENIPUNCTURE: CPT

## 2024-03-09 PROCEDURE — 85652 RBC SED RATE AUTOMATED: CPT

## 2024-03-09 PROCEDURE — 86431 RHEUMATOID FACTOR QUANT: CPT

## 2024-03-11 LAB — CCP IGG SERPL-ACNC: 1 U/ML (ref 0–6.9)

## 2024-03-22 ENCOUNTER — HOSPITAL ENCOUNTER (OUTPATIENT)
Dept: GENERAL RADIOLOGY | Age: 44
Discharge: HOME | End: 2024-03-22
Attending: STUDENT IN AN ORGANIZED HEALTH CARE EDUCATION/TRAINING PROGRAM
Payer: COMMERCIAL

## 2024-03-22 ENCOUNTER — OFFICE VISIT (OUTPATIENT)
Dept: FAMILY MEDICINE CLINIC | Facility: CLINIC | Age: 44
End: 2024-03-22
Payer: COMMERCIAL

## 2024-03-22 VITALS
WEIGHT: 228 LBS | TEMPERATURE: 97 F | HEIGHT: 70 IN | OXYGEN SATURATION: 96 % | BODY MASS INDEX: 32.64 KG/M2 | SYSTOLIC BLOOD PRESSURE: 124 MMHG | DIASTOLIC BLOOD PRESSURE: 80 MMHG | HEART RATE: 70 BPM

## 2024-03-22 DIAGNOSIS — M79.671 RIGHT FOOT PAIN: ICD-10-CM

## 2024-03-22 DIAGNOSIS — M10.9 ACUTE GOUT OF RIGHT FOOT, UNSPECIFIED CAUSE: Primary | ICD-10-CM

## 2024-03-22 PROCEDURE — 73630 X-RAY EXAM OF FOOT: CPT | Performed by: STUDENT IN AN ORGANIZED HEALTH CARE EDUCATION/TRAINING PROGRAM

## 2024-03-22 PROCEDURE — 99214 OFFICE O/P EST MOD 30 MIN: CPT | Performed by: STUDENT IN AN ORGANIZED HEALTH CARE EDUCATION/TRAINING PROGRAM

## 2024-03-22 RX ORDER — COLCHICINE 0.6 MG/1
TABLET ORAL
Qty: 20 TABLET | Refills: 0 | Status: SHIPPED | OUTPATIENT
Start: 2024-03-22

## 2024-03-22 NOTE — PROGRESS NOTES
Subjective:   Primitivo Thompson is a 43 year old male who presents for Foot Pain (Right foot pain, steroids seems to relieve the pain but currently out of medication. )     43-year-old male coming in due to recurrence of right lateral foot pain.  Was previously seen on 3/5/2024 and immediate care on 2/25/2024 for similar complaints.  At immediate care was prescribed colchicine, prednisone and cyclobenzaprine with improvement.  On 3/5/2024 was prescribed prednisone on which felt improved however pain started again 4 to 5 days ago for which he took 1 pill of prednisone that he had left with improvement.  Has been careful with his diet and not drinking any alcohol.  He is hydrating well.  Previously referred to rheumatology in 2019, was on allopurinol with goal uric acid to be less than 6.0.  He cannot take NSAIDs due to hives and swelling of the tongue.    History/Other:    Chief Complaint Reviewed and Verified  Nursing Notes Reviewed and   Verified  Tobacco Reviewed  Allergies Reviewed  Medications Reviewed    Medical History Reviewed  Surgical History Reviewed  Family History   Reviewed  Social History Reviewed         Tobacco:       Current Outpatient Medications   Medication Sig Dispense Refill    colchicine 0.6 MG Oral Tab Day 1: 1.2 mg at the first sign of flare, followed by 0.6 mg after 1 hour if needed. Day 2 and thereafter: 0.6 mg once or twice daily until flare resolves 20 tablet 0    methylPREDNISolone (MEDROL) 4 MG Oral Tablet Therapy Pack As directed. (Patient not taking: Reported on 3/5/2024) 21 each 0    cyclobenzaprine 10 MG Oral Tab Take 1 tablet (10 mg total) by mouth nightly. (Patient not taking: Reported on 3/5/2024) 14 tablet 0         Review of Systems:  Review of Systems   Constitutional:  Negative for chills, diaphoresis and fever.   HENT:  Negative for congestion, ear discharge, ear pain, sinus pressure, sinus pain and sore throat.    Eyes:  Negative for pain and discharge.    Respiratory:  Negative for cough, chest tightness, shortness of breath and wheezing.    Cardiovascular:  Negative for chest pain and palpitations.   Gastrointestinal:  Negative for abdominal pain, diarrhea, nausea and vomiting.   Endocrine: Negative for cold intolerance and heat intolerance.   Genitourinary:  Negative for dysuria, flank pain, frequency and urgency.   Musculoskeletal:  Negative for joint swelling.        Right foot pain.   Skin:  Negative for rash.   Neurological:  Negative for dizziness, syncope and headaches.   Psychiatric/Behavioral:  Negative for confusion and hallucinations.        Objective:   /80 (BP Location: Right arm, Patient Position: Sitting, Cuff Size: large)   Pulse 70   Temp 97.1 °F (36.2 °C) (Temporal)   Ht 5' 10\" (1.778 m)   Wt 228 lb (103.4 kg)   SpO2 96%   BMI 32.71 kg/m²  Estimated body mass index is 32.71 kg/m² as calculated from the following:    Height as of this encounter: 5' 10\" (1.778 m).    Weight as of this encounter: 228 lb (103.4 kg).  Physical Exam  Constitutional:       General: He is not in acute distress.     Appearance: Normal appearance. He is not ill-appearing or toxic-appearing.   HENT:      Head: Normocephalic and atraumatic.   Cardiovascular:      Rate and Rhythm: Normal rate and regular rhythm.      Heart sounds: Normal heart sounds. No murmur heard.     No gallop.   Pulmonary:      Effort: Pulmonary effort is normal. No respiratory distress.      Breath sounds: Normal breath sounds. No stridor. No wheezing, rhonchi or rales.   Abdominal:      General: Bowel sounds are normal.      Palpations: Abdomen is soft.      Tenderness: There is no abdominal tenderness. There is no right CVA tenderness, left CVA tenderness or guarding.   Musculoskeletal:      Cervical back: Normal range of motion and neck supple.      Comments: Right foot tenderness to palpation at lateral and superior lateral aspect.  Right foot dorsalis pedis pulse 2+.   Skin:      General: Skin is warm and dry.   Neurological:      General: No focal deficit present.      Mental Status: He is alert and oriented to person, place, and time. Mental status is at baseline.   Psychiatric:         Mood and Affect: Mood normal.         Behavior: Behavior normal.         Thought Content: Thought content normal.         Judgment: Judgment normal.         Assessment & Plan:     XR FOOT, COMPLETE (MIN 3 VIEWS), RIGHT (CPT=73630)    Result Date: 3/22/2024  CONCLUSION:  1. Soft tissue swelling of the right foot without radiographic evidence of underlying osseous injury.  2. Right calcaneal enthesopathy.    elm-remote.   Dictated by (CST): Aniceto Patino MD on 3/22/2024 at 1:30 PM     Finalized by (CST): Aniceto Patino MD on 3/22/2024 at 1:31 PM             1. Acute gout of right foot, unspecified cause (Primary)  Previously referred to rheumatology in 2019, was on allopurinol with goal uric acid to be less than 6.0.  He cannot take NSAIDs due to hives and swelling of the tongue.  -Avoid triggering factors  -If symptoms recur will restart allopurinol  -     Colchicine; Day 1: 1.2 mg at the first sign of flare, followed by 0.6 mg after 1 hour if needed. Day 2 and thereafter: 0.6 mg once or twice daily until flare resolves  Dispense: 20 tablet; Refill: 0  2. Right foot pain  -     XR FOOT (2 VIEW), RIGHT (CPT=73620); Future; Expected date: 03/22/2024        Return if symptoms worsen or fail to improve.    Clayton Betancourt MD, 3/22/2024, 11:49 AM

## 2025-03-05 ENCOUNTER — TELEPHONE (OUTPATIENT)
Dept: INTERNAL MEDICINE CLINIC | Facility: CLINIC | Age: 45
End: 2025-03-05

## (undated) DEVICE — [HIGH FLOW INSUFFLATOR,  DO NOT USE IF PACKAGE IS DAMAGED,  KEEP DRY,  KEEP AWAY FROM SUNLIGHT,  PROTECT FROM HEAT AND RADIOACTIVE SOURCES.]: Brand: PNEUMOSURE

## (undated) DEVICE — HOVERMATT 34IN SINGLE USE

## (undated) DEVICE — VISIGI 3D®  CALIBRATION SYSTEM  SIZE 40FR STD W/ BULB: Brand: BOEHRINGER® VISIGI 3D™ SLEEVE GASTRECTOMY CALIBRATION SYSTEM, SIZE 40FR W/BULB

## (undated) DEVICE — PROXIMATE SKIN STAPLERS (35 WIDE) CONTAINS 35 STAINLESS STEEL STAPLES (FIXED HEAD): Brand: PROXIMATE

## (undated) DEVICE — ENDOPATH ECHELON ENDOSCOPIC LINEAR CUTTER RELOADS, BLACK, 60MM: Brand: ECHELON ENDOPATH

## (undated) DEVICE — LIGASURE LAP MARYLAND 37CM

## (undated) DEVICE — Device: Brand: JELCO

## (undated) DEVICE — TROCAR: Brand: KII® SLEEVE

## (undated) DEVICE — SUTURE PDS II 1 CT-1

## (undated) DEVICE — TROCAR: Brand: KII FIOS FIRST ENTRY

## (undated) DEVICE — DERMABOND LIQUID ADHESIVE

## (undated) DEVICE — LAP CHOLE: Brand: MEDLINE INDUSTRIES, INC.

## (undated) DEVICE — SOL  .9 1000ML BTL

## (undated) DEVICE — ECHELON FLEX POWERED PLUS LONG ARTICULATING ENDOSCOPIC LINEAR CUTTER, 60MM: Brand: ECHELON FLEX

## (undated) DEVICE — SEAM GUARD 60 ECHELON

## (undated) DEVICE — GAMMEX® PI HYBRID SIZE 7, STERILE POWDER-FREE SURGICAL GLOVE, POLYISOPRENE AND NEOPRENE BLEND: Brand: GAMMEX

## (undated) DEVICE — CHLORAPREP 26ML APPLICATOR

## (undated) DEVICE — DRAPE SHEET LG

## (undated) DEVICE — UNDYED BRAIDED (POLYGLACTIN 910), SYNTHETIC ABSORBABLE SUTURE: Brand: COATED VICRYL

## (undated) DEVICE — SUTURE PASSOR WITH GUIDE

## (undated) DEVICE — ENDOPATH ECHELON ENDOSCOPIC LINEAR CUTTER RELOADS, GREEN, 60MM: Brand: ECHELON ENDOPATH

## (undated) DEVICE — ENCORE® LATEX MICRO SIZE 7.5, STERILE LATEX POWDER-FREE SURGICAL GLOVE: Brand: ENCORE

## (undated) NOTE — LETTER
[address]  [address] [facility]  [address] [place] [address] [phone]          Fabiano Segundo,     Your uric acid is still elevated but looks like it was decreasing from a month ago.  I would continu

## (undated) NOTE — LETTER
10/21/19        Stephan Allen  [address] [place] [id number]      Dear Juancho Smith records indicate that you have outstanding lab work and or testing that was ordered for you and has not yet been completed:  Orders Placed This Encounter

## (undated) NOTE — LETTER
Hospital Discharge Documentation  Please phone to schedule a hospital follow up appointment.     From: [id number]  Phone: [phone]    Patient discharged time/date: 10/29/2020  4:15 PM  Patient discharge disposition:  Home or Kristy Review of Systems:   Other 10 point ROS was asked and was negative    Physical Exam:     10/29/20  0337 10/29/20  0836 10/29/20  1155 10/29/20  1243   BP: 137/88 (!) 155/101 (!) 145/103 (!) 144/105   BP Location: Right arm Right arm Right arm    Pulse: 85 MCV 88.3 87.7 86.6   MCH 30.0 29.4 29.0   MCHC 34.0 33.5 33.5   RDW 12.8 12.8 12.7   NEPRELIM 8.18*  --   --    WBC 10.7 15.6* 12.2*   .0 289.0 278.0     Recent Labs   Lab 10/27/20  0659 10/28/20  0639 10/29/20  0621   GLU 99 93 87   BUN 10 11 12 You have had a procedure called bariatric surgery. During this procedure, a doctor surgically changed your stomach (and maybe small intestine as well).  It can only hold a small amount of food at one time, absorb a certain amount of food at one on time, or · Eat slowly. Plan on taking at least 20-30 minutes to finish a meal. Eating too much or too fast may cause pain, nausea and vomiting. · Remember \"slowly, small, moist and easy\".  Take small bites of food, and for a visual aid, use a saucer in place of a · Keep the incision clean and dry. Wash the incision gently with a mild soap and warm water. Then gently pat the incision dry with a towel. · Follow the doctor's instructions about caring for the dressing covering your incision.    · If your doctor used [address] [place] [address] Street TS.1 Yasmeen Ram MD on 10/29/2020  1:45 PM   TS. 2 - Geraldo Sorto MD on 10/29/2020  3:48 PM

## (undated) NOTE — ED AVS SNAPSHOT
Marilyn Stapleton   MRN: [id number]    Department:  [facility] Emergency Department   Date of Visit:  6/14/2019           Disclosure     Insurance plans vary and the physician(s) referred by the ER may not be covered by your plan.  Please conta CARE PHYSICIAN AT ONCE OR RETURN IMMEDIATELY TO THE EMERGENCY DEPARTMENT. If you have been prescribed any medication(s), please fill your prescription right away and begin taking the medication(s) as directed.   If you believe that any of the medications

## (undated) NOTE — MR AVS SNAPSHOT
[address]  [phone]               Thank you for choosing us for your health care visit with Trey Casey MD.  We are glad to serve you and happy to provide you with this summary of you If you've recently had a stay at the Hospital you can access your discharge instructions in [facility] by going to Visits < Admission Summaries.  If you've been to the Emergency Department or your doctor's office, you can view your past visit information in My

## (undated) NOTE — LETTER
Date & Time: 2/25/2024, 8:21 AM  Patient: Primitivo Thompson  Encounter Provider(s):    Dolores Villarreal APRN       To Whom It May Concern:    Primitivo Thompson was seen and treated in our department on 2/25/2024. He can return to work 02/27/2024. If he returns tomorrow no pushing pulling crawling climbing (ladder stairs etc) or walking greater than 5 minutes at a time for 02/26/2024.    MERCY Pollack, 02/25/24, 8:22 AM